# Patient Record
Sex: MALE | Race: BLACK OR AFRICAN AMERICAN | NOT HISPANIC OR LATINO | Employment: FULL TIME | URBAN - METROPOLITAN AREA
[De-identification: names, ages, dates, MRNs, and addresses within clinical notes are randomized per-mention and may not be internally consistent; named-entity substitution may affect disease eponyms.]

---

## 2021-09-20 ENCOUNTER — OFFICE VISIT (OUTPATIENT)
Dept: URGENT CARE | Facility: PHYSICIAN GROUP | Age: 46
End: 2021-09-20
Payer: COMMERCIAL

## 2021-09-20 ENCOUNTER — HOSPITAL ENCOUNTER (OUTPATIENT)
Facility: MEDICAL CENTER | Age: 46
End: 2021-09-20
Attending: NURSE PRACTITIONER
Payer: COMMERCIAL

## 2021-09-20 VITALS
OXYGEN SATURATION: 96 % | WEIGHT: 315 LBS | HEIGHT: 75 IN | TEMPERATURE: 97 F | SYSTOLIC BLOOD PRESSURE: 170 MMHG | RESPIRATION RATE: 12 BRPM | BODY MASS INDEX: 39.17 KG/M2 | HEART RATE: 106 BPM | DIASTOLIC BLOOD PRESSURE: 102 MMHG

## 2021-09-20 DIAGNOSIS — N30.00 ACUTE CYSTITIS WITHOUT HEMATURIA: ICD-10-CM

## 2021-09-20 DIAGNOSIS — N30.00 ACUTE CYSTITIS WITHOUT HEMATURIA: Primary | ICD-10-CM

## 2021-09-20 DIAGNOSIS — R03.0 ELEVATED BLOOD PRESSURE READING: ICD-10-CM

## 2021-09-20 DIAGNOSIS — R35.0 URINARY FREQUENCY: ICD-10-CM

## 2021-09-20 LAB
APPEARANCE UR: CLEAR
BILIRUB UR STRIP-MCNC: NORMAL MG/DL
COLOR UR AUTO: NORMAL
GLUCOSE UR STRIP.AUTO-MCNC: NORMAL MG/DL
KETONES UR STRIP.AUTO-MCNC: NORMAL MG/DL
LEUKOCYTE ESTERASE UR QL STRIP.AUTO: NORMAL
NITRITE UR QL STRIP.AUTO: NORMAL
PH UR STRIP.AUTO: 7 [PH] (ref 5–8)
PROT UR QL STRIP: NORMAL MG/DL
RBC UR QL AUTO: NORMAL
SP GR UR STRIP.AUTO: 1.02
UROBILINOGEN UR STRIP-MCNC: 0.2 MG/DL

## 2021-09-20 PROCEDURE — 99203 OFFICE O/P NEW LOW 30 MIN: CPT | Performed by: NURSE PRACTITIONER

## 2021-09-20 PROCEDURE — 87086 URINE CULTURE/COLONY COUNT: CPT

## 2021-09-20 PROCEDURE — 81002 URINALYSIS NONAUTO W/O SCOPE: CPT | Performed by: NURSE PRACTITIONER

## 2021-09-20 RX ORDER — CIPROFLOXACIN 500 MG/1
500 TABLET, FILM COATED ORAL EVERY 12 HOURS
Qty: 14 TABLET | Refills: 0 | Status: SHIPPED | OUTPATIENT
Start: 2021-09-20 | End: 2021-09-27

## 2021-09-20 RX ORDER — CARBAMAZEPINE 200 MG/1
CAPSULE, EXTENDED RELEASE ORAL
COMMUNITY
Start: 2021-07-24 | End: 2021-10-01 | Stop reason: SDUPTHER

## 2021-09-20 RX ORDER — PHENAZOPYRIDINE HYDROCHLORIDE 200 MG/1
200 TABLET, FILM COATED ORAL 3 TIMES DAILY PRN
Qty: 6 TABLET | Refills: 0 | Status: SHIPPED | OUTPATIENT
Start: 2021-09-20 | End: 2021-09-22

## 2021-09-20 ASSESSMENT — ENCOUNTER SYMPTOMS
CHILLS: 0
FEVER: 0
SENSORY CHANGE: 0
HEADACHES: 0
COUGH: 0
NAUSEA: 0
FLANK PAIN: 0

## 2021-09-20 ASSESSMENT — LIFESTYLE VARIABLES: SUBSTANCE_ABUSE: 0

## 2021-09-20 NOTE — PROGRESS NOTES
"Johan Wellington is a 46 y.o. male who presents for UTI (bladder soreness, discolored urine, frequency, x1 day )      HPI This is a new problem. Johan is a 47 y/o male pt with c/o dysuria and discolored urine.  He denies history of previous UTIs.  He knows is unusual for men to get UTIs.  He thinks he has not been drinking as much water as he normally does.  He currently does not have a primary care provider.  He has no history of hypertension.  He denies headache, chest pain, shortness of breath, leg swelling.  He denies penile discharge.  No other aggravating or alleviating factors.  Treatments tried increase fluids.    Review of Systems   Constitutional: Negative for chills, fever and malaise/fatigue.   Respiratory: Negative for cough.    Gastrointestinal: Negative for nausea.   Genitourinary: Positive for dysuria and urgency. Negative for flank pain and hematuria.   Neurological: Negative for sensory change and headaches.   Endo/Heme/Allergies: Negative for environmental allergies.   Psychiatric/Behavioral: Negative for substance abuse.       Allergies:     No Known Allergies    PMSFS Hx:  History reviewed. No pertinent past medical history.  History reviewed. No pertinent surgical history.  History reviewed. No pertinent family history.  Social History     Tobacco Use   • Smoking status: Never Smoker   • Smokeless tobacco: Never Used   Substance Use Topics   • Alcohol use: Not Currently       Problems:   There is no problem list on file for this patient.      Medications:   Current Outpatient Medications on File Prior to Visit   Medication Sig Dispense Refill   • carbamazepine (CARBATROL) 200 MG CR capsule        No current facility-administered medications on file prior to visit.          Objective:     BP (!) 170/102 (BP Location: Left arm, Patient Position: Sitting, BP Cuff Size: Adult)   Pulse (!) 106   Temp 36.1 °C (97 °F) (Temporal)   Resp 12   Ht 1.905 m (6' 3\")   Wt (!) 227 kg (500 lb)   " SpO2 96%   BMI 62.50 kg/m²     Physical Exam  Vitals and nursing note reviewed.   Constitutional:       General: He is not in acute distress.     Appearance: Normal appearance. He is well-developed. He is obese. He is not ill-appearing.   Cardiovascular:      Rate and Rhythm: Normal rate and regular rhythm.      Pulses: Normal pulses.      Heart sounds: Normal heart sounds.   Pulmonary:      Effort: Pulmonary effort is normal.   Abdominal:      General: Bowel sounds are normal. There is no distension.      Palpations: Abdomen is not rigid.      Tenderness: There is no abdominal tenderness. There is no right CVA tenderness or left CVA tenderness.   Skin:     General: Skin is warm and dry.      Capillary Refill: Capillary refill takes less than 2 seconds.   Neurological:      Mental Status: He is alert and oriented to person, place, and time.   Psychiatric:         Mood and Affect: Mood normal.         Behavior: Behavior normal.         Thought Content: Thought content normal.     UA: pos nitrates    Assessment /Associated Orders:      1. Acute cystitis without hematuria  AMB REFERRAL TO ESTABLISH WITH [facility]    ciprofloxacin (CIPRO) 500 MG Tab    URINE CULTURE(NEW)   2. Urinary frequency  POCT Rapid Strep A    AMB REFERRAL TO ESTABLISH WITH [facility]    phenazopyridine (PYRIDIUM) 200 MG Tab   3. Elevated blood pressure reading  AMB REFERRAL TO ESTABLISH WITH [facility]CP       Medical Decision Making:    Pt is clinically stable at today's acute urgent care visit.  No acute distress noted. Appropriate for outpatient management at this time.   Acute problem today with uncertain prognosis.     Educated in proper administration of medication(s) ordered today including safety, possible SE, risks, benefits, rationale and alternatives to therapy.     Keep well hydrated    Urine culture- pending     Referred to his primary care provider for monitoring and management. Educated in end organ effects of uncontrolled BP   including MI, CVA, Blindness, CRF and death. Educated in TLC's.  Recommend ambulatory BP monitoring.     Referral placed for PCP     Advised to follow-up with the primary care provider for recheck, reevaluation, and consideration of further management if necessary.   Discussed management options (risks,benefits, and alternatives to treatment). Expressed understanding and the treatment plan was agreed upon. Questions were encouraged and answered   Return to urgent care prn if new or worsening sx or if there is no improvement in condition prn.    Educated in Red flags and indications to immediately call 911 or present to the Emergency Department.     I personally reviewed prior external notes and test results pertinent to today's visit.  I have independently reviewed and interpreted all diagnostics ordered during this urgent care acute visit.   Time spent evaluating this patient was at least 30 minutes and includes preparing for visit, counseling/education, exam and evaluation, obtaining history, independent interpretation, ordering lab/test/procedures,medication management and documentation.Time does not include separately billable procedures noted .

## 2021-09-22 LAB
BACTERIA UR CULT: NORMAL
SIGNIFICANT IND 70042: NORMAL
SITE SITE: NORMAL
SOURCE SOURCE: NORMAL

## 2021-10-01 ENCOUNTER — OFFICE VISIT (OUTPATIENT)
Dept: MEDICAL GROUP | Facility: PHYSICIAN GROUP | Age: 46
End: 2021-10-01
Payer: COMMERCIAL

## 2021-10-01 VITALS
RESPIRATION RATE: 16 BRPM | OXYGEN SATURATION: 92 % | TEMPERATURE: 98.1 F | BODY MASS INDEX: 39.17 KG/M2 | HEART RATE: 110 BPM | WEIGHT: 315 LBS | HEIGHT: 75 IN | SYSTOLIC BLOOD PRESSURE: 160 MMHG | DIASTOLIC BLOOD PRESSURE: 102 MMHG

## 2021-10-01 DIAGNOSIS — G44.029 CHRONIC CLUSTER HEADACHE, NOT INTRACTABLE: ICD-10-CM

## 2021-10-01 DIAGNOSIS — R56.9 SEIZURE (HCC): ICD-10-CM

## 2021-10-01 DIAGNOSIS — Z13.220 ENCOUNTER FOR SCREENING FOR LIPID DISORDER: ICD-10-CM

## 2021-10-01 DIAGNOSIS — I10 PRIMARY HYPERTENSION: ICD-10-CM

## 2021-10-01 DIAGNOSIS — Z13.29 SCREENING FOR THYROID DISORDER: ICD-10-CM

## 2021-10-01 DIAGNOSIS — Z00.00 ENCOUNTER FOR HEALTH MAINTENANCE EXAMINATION IN ADULT: ICD-10-CM

## 2021-10-01 DIAGNOSIS — Z13.21 ENCOUNTER FOR VITAMIN DEFICIENCY SCREENING: ICD-10-CM

## 2021-10-01 DIAGNOSIS — D18.02 HEMANGIOMA OF INTRACRANIAL STRUCTURE (HCC): ICD-10-CM

## 2021-10-01 DIAGNOSIS — Z13.1 ENCOUNTER FOR SCREENING FOR DIABETES MELLITUS: ICD-10-CM

## 2021-10-01 DIAGNOSIS — Z79.899 HIGH RISK MEDICATION USE: ICD-10-CM

## 2021-10-01 PROCEDURE — 99214 OFFICE O/P EST MOD 30 MIN: CPT | Performed by: NURSE PRACTITIONER

## 2021-10-01 PROCEDURE — 99396 PREV VISIT EST AGE 40-64: CPT | Mod: 25 | Performed by: NURSE PRACTITIONER

## 2021-10-01 RX ORDER — CARBAMAZEPINE 200 MG/1
200 CAPSULE, EXTENDED RELEASE ORAL 2 TIMES DAILY
Qty: 180 CAPSULE | Refills: 1 | Status: SHIPPED | OUTPATIENT
Start: 2021-10-01 | End: 2021-11-19 | Stop reason: SDUPTHER

## 2021-10-01 RX ORDER — AMLODIPINE BESYLATE 5 MG/1
5 TABLET ORAL DAILY
Qty: 90 TABLET | Refills: 1 | Status: SHIPPED | OUTPATIENT
Start: 2021-10-01 | End: 2021-11-19

## 2021-10-01 RX ORDER — SUMATRIPTAN 100 MG/1
100 TABLET, FILM COATED ORAL
Qty: 10 TABLET | Refills: 3 | Status: SHIPPED | OUTPATIENT
Start: 2021-10-01 | End: 2022-01-11 | Stop reason: SDUPTHER

## 2021-10-01 ASSESSMENT — PATIENT HEALTH QUESTIONNAIRE - PHQ9: CLINICAL INTERPRETATION OF PHQ2 SCORE: 0

## 2021-10-02 NOTE — ASSESSMENT & PLAN NOTE
This is a new diagnosis. He has never been on medication before.     Will start him on amlodipine 5 mg. Will have patient return in one month.

## 2021-10-02 NOTE — ASSESSMENT & PLAN NOTE
This is a chronic condition. He reports that his mom had cluster headaches as well.     He has seen a neurologist in the past as well due to his seizure history. He states he cannot take NSAIDs due to his history of a brain bleed. He was given imitrex for his headaches. He states that this does help. However, it makes him drowsy. He states he cannot take it at work. He reports that he is getting a few headaches days month.     Will continue with imitrex as needed and place new referral to neurology.

## 2021-10-02 NOTE — PROGRESS NOTES
"  CC: establish care                                                                                                                                    HPI:   Johan presents today with the following.    Problem   Seizure (Hcc)   Hemangioma of Intracranial Structure (Hcc)   Primary Hypertension   Chronic Cluster Headache, Not Intractable       Current Outpatient Medications   Medication Sig Dispense Refill   • amLODIPine (NORVASC) 5 MG Tab Take 1 Tablet by mouth every day. 90 Tablet 1   • sumatriptan (IMITREX) 100 MG tablet Take 1 Tablet by mouth one time as needed for Migraine for up to 1 dose. 10 Tablet 3   • carbamazepine (CARBATROL) 200 MG CR capsule Take 1 Capsule by mouth 2 times a day. 180 Capsule 1     No current facility-administered medications for this visit.       Allergies as of 10/01/2021   • (No Known Allergies)        ROS:  Gen: no fevers/chills, no changes in weight  Pulm: no sob, no cough  CV: no chest pain, no palpitations  GI: no nausea/vomiting, no diarrhea  Neuro: no headaches, no numbness/tingling      /102 (BP Location: Left arm, Patient Position: Sitting, BP Cuff Size: Adult)   Pulse (!) 110   Temp 36.7 °C (98.1 °F) (Temporal)   Resp 16   Ht 1.905 m (6' 3\")   Wt (!) 252 kg (555 lb)   SpO2 92%   BMI 69.37 kg/m²     Physical Exam:  Gen:         Alert and oriented, No apparent distress.  Neck:        No Lymphadenopathy.   Lungs:     Clear to auscultation bilaterally. No wheezes, rales, or rhonchi.   CV:          Regular rate and rhythm. No murmurs, rubs or gallops.         Ext:          No clubbing, cyanosis, or peripheral edema.  Skin:  All visible skin intact without lesions.       Assessment and Plan:  46 y.o. male with the following issues.    1. Seizure (HCC)  REFERRAL TO NEUROLOGY    CANCELED: REFERRAL TO NEUROLOGY   2. Hemangioma of intracranial structure (HCC)     3. Primary hypertension     4. Chronic cluster headache, not intractable     5. Encounter for vitamin " deficiency screening  VITAMIN D,25 HYDROXY   6. Screening for thyroid disorder  TSH    FREE THYROXINE   7. Encounter for screening for diabetes mellitus  HEMOGLOBIN A1C   8. Encounter for screening for lipid disorder  Lipid Profile   9. Encounter for health maintenance examination in adult  VITAMIN D,25 HYDROXY    TSH    FREE THYROXINE    HEMOGLOBIN A1C    Lipid Profile    CBC WITHOUT DIFFERENTIAL    Comp Metabolic Panel   10. High risk medication use  CARBAMAZEPINE        Hemangioma of intracranial structure (HCC)  This was discovered when he was 27. He ended up having a brain bleed that led to a seizure. Since then he has been on carbamazepine since then.     He would like to establish with a new neurologist to discuss if he needs to continue the carbamazepine.     Chronic cluster headache, not intractable  This is a chronic condition. He reports that his mom had cluster headaches as well.     He has seen a neurologist in the past as well due to his seizure history. He states he cannot take NSAIDs due to his history of a brain bleed. He was given imitrex for his headaches. He states that this does help. However, it makes him drowsy. He states he cannot take it at work. He reports that he is getting a few headaches days month.     Will continue with imitrex as needed and place new referral to neurology.     Primary hypertension  This is a new diagnosis. He has never been on medication before.     Will start him on amlodipine 5 mg. Will have patient return in one month.      Return in about 1 month (around 11/1/2021) for follow up for labs and blood pressure.    I have placed the below orders and discussed them with an approved delegating provider.  The MA is performing the below orders under the direction of Dr. Ryan.    Please note that this dictation was created using voice recognition software. I have worked with consultants from the vendor as well as technical experts from [employer] to optimize the  interface. I have made every reasonable attempt to correct obvious errors, but I expect that there are errors of grammar and possibly content that I did not discover before finalizing the note.

## 2021-10-02 NOTE — ASSESSMENT & PLAN NOTE
This was discovered when he was 27. He ended up having a brain bleed that led to a seizure. Since then he has been on carbamazepine since then.     He would like to establish with a new neurologist to discuss if he needs to continue the carbamazepine.

## 2021-11-13 LAB — HBA1C MFR BLD: 5.9 % (ref 0–5.6)

## 2021-11-19 ENCOUNTER — OFFICE VISIT (OUTPATIENT)
Dept: MEDICAL GROUP | Facility: PHYSICIAN GROUP | Age: 46
End: 2021-11-19
Payer: COMMERCIAL

## 2021-11-19 VITALS
SYSTOLIC BLOOD PRESSURE: 144 MMHG | DIASTOLIC BLOOD PRESSURE: 88 MMHG | WEIGHT: 315 LBS | BODY MASS INDEX: 39.17 KG/M2 | HEIGHT: 75 IN | TEMPERATURE: 97.7 F | RESPIRATION RATE: 16 BRPM | OXYGEN SATURATION: 93 % | HEART RATE: 103 BPM

## 2021-11-19 DIAGNOSIS — R56.9 SEIZURE (HCC): ICD-10-CM

## 2021-11-19 DIAGNOSIS — G89.29 CHRONIC BILATERAL LOW BACK PAIN WITHOUT SCIATICA: ICD-10-CM

## 2021-11-19 DIAGNOSIS — H02.9 EYELID LESION: ICD-10-CM

## 2021-11-19 DIAGNOSIS — I10 PRIMARY HYPERTENSION: ICD-10-CM

## 2021-11-19 DIAGNOSIS — M54.50 CHRONIC BILATERAL LOW BACK PAIN WITHOUT SCIATICA: ICD-10-CM

## 2021-11-19 PROCEDURE — 99214 OFFICE O/P EST MOD 30 MIN: CPT | Performed by: NURSE PRACTITIONER

## 2021-11-19 RX ORDER — VITAMIN B COMPLEX
2000 TABLET ORAL DAILY
COMMUNITY

## 2021-11-19 RX ORDER — AMLODIPINE BESYLATE 10 MG/1
10 TABLET ORAL DAILY
Qty: 90 TABLET | Refills: 3 | Status: SHIPPED
Start: 2021-11-19 | End: 2021-11-19

## 2021-11-19 RX ORDER — CARBAMAZEPINE 200 MG/1
400 CAPSULE, EXTENDED RELEASE ORAL 2 TIMES DAILY
Qty: 360 CAPSULE | Refills: 1 | Status: SHIPPED
Start: 2021-11-19 | End: 2021-11-19

## 2021-11-19 RX ORDER — AMLODIPINE BESYLATE 10 MG/1
10 TABLET ORAL DAILY
Qty: 90 TABLET | Refills: 3 | Status: SHIPPED | OUTPATIENT
Start: 2021-11-19 | End: 2022-02-15 | Stop reason: SDUPTHER

## 2021-11-19 RX ORDER — CYCLOBENZAPRINE HCL 5 MG
5 TABLET ORAL 3 TIMES DAILY PRN
Qty: 30 TABLET | Refills: 3 | Status: SHIPPED | OUTPATIENT
Start: 2021-11-19 | End: 2022-02-15 | Stop reason: SDUPTHER

## 2021-11-19 RX ORDER — CYCLOBENZAPRINE HCL 5 MG
5 TABLET ORAL 3 TIMES DAILY PRN
Qty: 30 TABLET | Refills: 3 | Status: SHIPPED
Start: 2021-11-19 | End: 2021-11-19

## 2021-11-19 RX ORDER — CARBAMAZEPINE 200 MG/1
400 CAPSULE, EXTENDED RELEASE ORAL 2 TIMES DAILY
Qty: 360 CAPSULE | Refills: 1 | Status: SHIPPED | OUTPATIENT
Start: 2021-11-19 | End: 2022-02-15 | Stop reason: SDUPTHER

## 2021-11-20 NOTE — ASSESSMENT & PLAN NOTE
This is a chronic condition.     Patient has an appointment with neurology in Jan.     Continue carbamazepine.

## 2021-11-20 NOTE — ASSESSMENT & PLAN NOTE
This is a chronic stable condition. Patient is doing well on the amlodipine. His blood pressure has improved.     Will increase amplodipine to 10mg

## 2021-11-20 NOTE — PROGRESS NOTES
"  CC: follow up for blood pressure                                                                                                                                    HPI:   Johan presents today with the following.    Problem   Chronic Bilateral Low Back Pain Without Sciatica   Seizure (Hcc)   Primary Hypertension       Current Outpatient Medications   Medication Sig Dispense Refill   • coenzyme Q-10 30 MG capsule Take 60 mg by mouth every day.     • B Complex Vitamins (B COMPLEX 1 PO) Take 1 Tablet by mouth every day.     • Multiple Vitamins-Minerals (EMERGEN-C IMMUNE PLUS PO) Take 1 Tablet by mouth every day.     • vitamin D3 (CHOLECALCIFEROL) 1000 Unit (25 mcg) Tab Take 2,000 Units by mouth every day.     • carbamazepine (CARBATROL) 200 MG CR capsule Take 2 Capsules by mouth 2 times a day for 90 days. 360 Capsule 1   • amLODIPine (NORVASC) 10 MG Tab Take 1 Tablet by mouth every day. 90 Tablet 3   • cyclobenzaprine (FLEXERIL) 5 mg tablet Take 1 Tablet by mouth 3 times a day as needed for Moderate Pain or Muscle Spasms. 30 Tablet 3   • sumatriptan (IMITREX) 100 MG tablet Take 1 Tablet by mouth one time as needed for Migraine for up to 1 dose. 10 Tablet 3     No current facility-administered medications for this visit.       Allergies as of 11/19/2021   • (No Known Allergies)        ROS:  All systems negative expect as addressed in assessment and plan.     /88 (BP Location: Left arm, Patient Position: Sitting, BP Cuff Size: Adult)   Pulse (!) 103   Temp 36.5 °C (97.7 °F) (Temporal)   Resp 16   Ht 1.905 m (6' 3\")   Wt (!) 252 kg (555 lb)   SpO2 93%   BMI 69.37 kg/m²     Physical Exam:  Gen:         Alert and oriented, No apparent distress.  Neck:        No Lymphadenopathy.   Lungs:     Clear to auscultation bilaterally. No wheezes, rales, or rhonchi.   CV:          Regular rate and rhythm. No murmurs, rubs or gallops.         Ext:          No clubbing, cyanosis, or peripheral edema.  Skin:  All visible " skin intact without lesions.       Assessment and Plan:  46 y.o. male with the following issues.    1. Primary hypertension     2. Chronic bilateral low back pain without sciatica     3. Seizure (HCC)     4. Eyelid lesion  Referral to Dermatology        Primary hypertension  This is a chronic stable condition. Patient is doing well on the amlodipine. His blood pressure has improved.     Will increase amplodipine to 10mg      Seizure (HCC)  This is a chronic condition.     Patient has an appointment with neurology in Jan.     Continue carbamazepine.       Return in about 3 months (around 2/19/2022) for follow up for hypertension.    I have placed the below orders and discussed them with an approved delegating provider.  The MA is performing the below orders under the direction of Dr. Hernandez.    Please note that this dictation was created using voice recognition software. I have worked with consultants from the vendor as well as technical experts from [employer] [facility] [employer] to optimize the interface. I have made every reasonable attempt to correct obvious errors, but I expect that there are errors of grammar and possibly content that I did not discover before finalizing the note.

## 2021-12-22 ENCOUNTER — APPOINTMENT (RX ONLY)
Dept: URBAN - METROPOLITAN AREA CLINIC 31 | Facility: CLINIC | Age: 46
Setting detail: DERMATOLOGY
End: 2021-12-22

## 2021-12-22 PROBLEM — D23.39 OTHER BENIGN NEOPLASM OF SKIN OF OTHER PARTS OF FACE: Status: ACTIVE | Noted: 2021-12-22

## 2021-12-22 PROBLEM — D23.122 OTHER BENIGN NEOPLASM OF SKIN OF LEFT LOWER EYELID, INCLUDING CANTHUS: Status: ACTIVE | Noted: 2021-12-22

## 2021-12-22 PROCEDURE — ? BENIGN DESTRUCTION

## 2021-12-22 PROCEDURE — 17110 DESTRUCTION B9 LES UP TO 14: CPT

## 2021-12-22 PROCEDURE — ? ADDITIONAL NOTES

## 2021-12-22 PROCEDURE — ? COUNSELING

## 2021-12-22 NOTE — PROCEDURE: BENIGN DESTRUCTION
Post-Care Instructions: I reviewed with the patient in detail post-care instructions. Patient is to wear sunprotection, and avoid picking at any of the treated lesions. Pt may apply Vaseline to crusted or scabbing areas.
Medical Necessity Information: It is in your best interest to select a reason for this procedure from the list below. All of these items fulfill various CMS LCD requirements except the new and changing color options.
Render Post-Care Instructions In Note?: no
Medical Necessity Clause: This procedure was medically necessary because the lesions that were treated were:
Treatment Number (Will Not Render If 0): 0
Consent: The patient's consent was obtained including but not limited to risks of crusting, scabbing, blistering, scarring, darker or lighter pigmentary change, recurrence, incomplete removal and infection.
Detail Level: Detailed
Anesthesia Volume In Cc: 1
Anesthesia Type: 1% Xylocaine with [id number] epinephrine and sodium bicarbonate

## 2021-12-22 NOTE — PROCEDURE: ADDITIONAL NOTES
Render Risk Assessment In Note?: no
Detail Level: Detailed
Additional Notes: Discussed in detail benign diagnosis and treatments. \\nWill extract today with 11 blade. \\nDiscussed treatment and risks with patient.  \\nIncludes concern on intake.

## 2022-01-11 RX ORDER — SUMATRIPTAN 100 MG/1
100 TABLET, FILM COATED ORAL
Qty: 10 TABLET | Refills: 0 | Status: SHIPPED | OUTPATIENT
Start: 2022-01-11 | End: 2022-02-15 | Stop reason: SDUPTHER

## 2022-01-14 ENCOUNTER — OFFICE VISIT (OUTPATIENT)
Dept: NEUROLOGY | Facility: MEDICAL CENTER | Age: 47
End: 2022-01-14
Attending: PSYCHIATRY & NEUROLOGY
Payer: COMMERCIAL

## 2022-01-14 VITALS
DIASTOLIC BLOOD PRESSURE: 84 MMHG | HEART RATE: 81 BPM | OXYGEN SATURATION: 91 % | BODY MASS INDEX: 39.17 KG/M2 | HEIGHT: 75 IN | WEIGHT: 315 LBS | SYSTOLIC BLOOD PRESSURE: 132 MMHG

## 2022-01-14 DIAGNOSIS — R56.9 SEIZURE (HCC): ICD-10-CM

## 2022-01-14 DIAGNOSIS — D18.02 HEMANGIOMA OF INTRACRANIAL STRUCTURE (HCC): ICD-10-CM

## 2022-01-14 PROCEDURE — 99205 OFFICE O/P NEW HI 60 MIN: CPT | Performed by: PSYCHIATRY & NEUROLOGY

## 2022-01-14 RX ORDER — FROVATRIPTAN SUCCINATE 2.5 MG/1
2.5 TABLET, FILM COATED ORAL 2 TIMES DAILY PRN
Qty: 20 TABLET | Refills: 1 | Status: CANCELLED | OUTPATIENT
Start: 2022-01-14

## 2022-01-14 RX ORDER — ZOLMITRIPTAN 5 MG/1
5 TABLET, ORALLY DISINTEGRATING ORAL PRN
Qty: 10 TABLET | Refills: 3 | Status: CANCELLED | OUTPATIENT
Start: 2022-01-14

## 2022-01-14 RX ORDER — RIMEGEPANT SULFATE 75 MG/75MG
75 TABLET, ORALLY DISINTEGRATING ORAL
Qty: 8 TABLET | Refills: 1 | Status: CANCELLED | OUTPATIENT
Start: 2022-01-14 | End: 2022-04-14

## 2022-01-14 ASSESSMENT — PATIENT HEALTH QUESTIONNAIRE - PHQ9: CLINICAL INTERPRETATION OF PHQ2 SCORE: 0

## 2022-01-14 NOTE — PATIENT INSTRUCTIONS
Seizure precautions    Driving    Every State restricts driving in people with seizures. [place] requires that you be seizure free for 3 months from the date of your last seizure. The Department of Motor Vehicles (DMV), not the doctor, makes the decision on driving. Exceptions may be made for seizures that do not affect mental condition and ability to control a car, or that occur 100% during sleep.. We recommend:  § Do not drive if you are having seizures that would be dangerous on the road.  § Be honest with your doctor about your seizures, even if driving may be at risk  § Be honest with the DMV (use the driving form). It may protect you legally if problems later occur.    Seizure medicines and suicide    Seizure medicines have long been known to help some people with depression, but also to make others worse. The FDA recently took a look at their database of clinical studies of people taking epilepsy medicines. Their finding was 4 suicides in 27,863 patients taking epilepsy medicines, versus none in patients taking placebo (an inactive pill). They reported 105 people of the 27,863 who did not commit suicide, but had thoughts of suicide. Combined, the risk for suicidal thoughts and behavior was about 0.4% (1 in 250) for those taking epilepsy medications and 0.2% (1 in 500) for those given placebos.    This is new and important information because doctors and patients need to know the possible side effects of medicines. But it needs to be put in perspective and certainly is no reason for panic. The 4 suicides in 27,863 is a very small percentage, and it is impossible to be sure the epilepsy drugs were the cause. Depression occurs in about 10% or more of people with epilepsy, even independent of medications. We recommend the following.  § Do not stop your seizure medicine. It could be dangerous.  § If you have symptoms of depression, such as crying and low mood, please discuss them at your clinic visits, so a  decision can be made about whether antidepressant medication or referral to a psychotherapist would be useful.  § If you are thinking seriously about suicide, please call 911.  § For most people, this FDA warning is just something to know, but not a reason to change medicines.    Bone health    Several of the older antiseizure medications may cause thinning of bones with long-term use, leading to broken bones later in life. This is most problematic with phenytoin (Dilantin), but may occur with carbamazepine (Tegretol, Carbatrol), phenobarbital, primidone (Mysoline) and possibly valproate/valproic acid (Depakote, Depakene). This is a larger concern for women in mid-life or older, but it also can be an issue for men and younger women.   § This potential problem is not an emergency and occurs over months to years; do not stop your seizure medication without discussing your concerns with your doctor.   § Calcium, vitamin D3 supplementation and regular exercise may be helpful to maintain bone health.  § Periodic bone density screening may be helpful to show existence or progression of bone thinning.     Water Safety    You could drown during a seizure that occurs in water. Use the [employer] system for swimming. Let the edwige know that you have seizures. Take showers instead of baths.    Burn safety    If you have uncontrolled seizures, be very careful around heat or flames. Cook on the back burner - you are less likely to lean on the burner or turn over the soup during a seizure. Don’t smoke, which is good advice for other reasons as well. Set the maximum house hot water temperature to 110 degrees Fahrenheit. Put guards on open fireplaces, wood stoves or radiators.     Heights    Occasional use of ladders and going up and down stairs is a reasonable risk. If your seizures are not in control, then do not work on ladders or unprotected heights for more than brief minutes.    Equipment and Power Tools    Cutting, chopping and  drilling equipment should have safety guards to avoid inadvertent injury; otherwise, do not use it if your seizures are not fully controlled. Do not use mowers lacking automatic stop switches or chain saws.     Safety    If you have uncontrolled seizures, do not carry your child in your arms, but use one of the slings/papooses. Change the baby on the floor. Do not bathe the baby in water deep enough for the mouth to be underwater. Breastfeeding is usually considered beneficial, even though small amounts of seizure medicines come out in the breast milk.    Fall Precautions      If you fall with some of your seizures, then fall-proof your environment. Put in carpets, cover sharp corners, and consider wearing a protective helmet in some circumstances.    Sudden Unexplained Death in Epilepsy (SUDEP)    It is rare for people to die from a seizure, but it can happen. One way is trauma or a car crash from a seizure. Another is the poorly understood condition called sudden unexplained death in epilepsy (SUDEP). We think this is most likely due to heart arrhythmias (irregular beats) caused by a seizure, but the mechanism is debated. For people with uncontrolled seizures we recommend:  § Do not suddenly stop your seizure medication, since this can be a risk factor for SUDEP.  § Do not be overly worried about SUDEP. It is tragic when it happens, but it is uncommon and there are currently no preventive measures, other than the best possible seizure control.    Medication Side Effects    To be approved for prescription use, seizure medicines must pass strict safety testing. Nevertheless, they all have side effects, some of which are potentially serious or even lethal. The risks of medications must be balanced against the risks of seizures. A full discussion of possible medicine side effects is not possible here, but we recommend:  § Know the main side effects of your seizure medicines. Your doctor is the best source  for individual information. Web sites such as epilepsyfoundation.org and epilepsy.com have good information.  § The package insert provided with your prescription lists full information on side effects, but most of these will never occur in an individual. Let the package insert inform you, but not scare you. Be aware that some side effects occur from drug interactions among all your medications. Interactions can involve prescription medicines, over-the-counter medicines, herbal remedies and even some foods. Grapefruit juice is an example of a seemingly benign food that can raise levels of carbamazepine or other drugs.  § Generic medications are less expensive, but may not produce the same blood levels as do brand name drugs or even other generics. Insurance plans and pharmacies sometimes switch to generics without patient or doctor approval. Be cautious if you are switching or being switched to generics. It may work out fine (and it often is a lot less expensive), but a blood test to check levels might be useful.    Recreation    If having a seizure during a recreational activity would cause you significant harm, then do not do the activity. Use common sense. Confer with your medical team for individual restrictions. As a general guideline for starting discussion with your medical team, we recommend:  § Low-risk recreation can be done by people with seizures, even if not in control. These include walking, running, bowling, golf, baseball, basketball, soccer, volleyball, swimming with the [employer] system, weight training with machines or spotters, elliptical trainers, treadmills with spotters. Confirm this with your medical care team.  § You should be able to go at least 3 months without a seizure to participate in medium-risk activities, but confirm this interval with your doctor. These include football, hockey, ice skating, bike racing, gymnastics, horseback riding and boating.  § You should be seizure free for more  than a year to perform high-risk activities, although some doctors recommend not engaging in high-risk recreational activities at all with a history of epilepsy. Ask yours if it is safe to engage in high-risk recreation. High-risk activities include hang gliding, motor sports, skiing, competitive skateboarding, mountain or rock climbing and SCUBA diving.         Bactrim Counseling:  I discussed with the patient the risks of sulfa antibiotics including but not limited to GI upset, allergic reaction, drug rash, diarrhea, dizziness, photosensitivity, and yeast infections.  Rarely, more serious reactions can occur including but not limited to aplastic anemia, agranulocytosis, methemoglobinemia, blood dyscrasias, liver or kidney failure, lung infiltrates or desquamative/blistering drug rashes.

## 2022-01-14 NOTE — PROGRESS NOTES
New Patient    Patient: Johan Wellington  : 1975    Referring Physician:Alpa Morgan A.P* Jennifer L Simcox, A.P.R.N.    CC: seizures      IMPRESSION:    1. Seizure type and frequency of seizures-focal onset seizure   Only seizure   2. Etiology/Syndrome- focal structural lesion   3. EEG findings- n/a  4. 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 Antiepileptic drug side effects and counseling done  Current AED: /400  6. Surgery consideration vns done   7. Safety issues counseling done     Left frontoparietal hemisphere hemangioma: stable on CTA   Cluster headache: respond to Imitrex     PLAN:    1. Seizure (HCC)  - Referral to Neurodiagnostics (EEG,EP,EMG/NCS/DBS)    2. Hemangioma of intracranial structure (HCC)  - Referral to Neurology     Routine EEG for baseline  Given his risk for seizure, recommend to continue CBZ for now. We will discuss further till EEG is completed.   3. Refer to headache/stroke bridge clinic  4. Return in 6 months.         HISTORY:    I had the opportunity to see Mr. Johan Wellington in the epilepsy clinic on 2022 for seizure disorder. he came alone.  This is his initial visit. he is referred by MARTINA Guardado for possible seizure disorder. He was previously seen by Dr. West.      Dominant hand: right handed   Outside records and ED record reviewed: Yes.    In brief, his pertinent medical history is remarkable for left hemisphere hemangioma s/p hemorrhage with resultant seizure at age 27 ()  One seizure only, nocturnal seizure and he was found to have to ICH due to hemangioma   He was started on PHT with gum issue and switched to CBZ.   He has been on CBZ since then without recurrent seizure.     He has chronic cluster headache, his mother also has cluster headache.   He was given Imitrex with good resolution for headache, he has 2 per month.     The seizures were isolated. There was    no cluster  of seizures,    no history of status epilepticus    Special features:  They were noted only during the sleep,  there was no specific timing.     Potential triggering factors were reviewed   Sleep deprivation   Alcohol   Stress   Other    Epilepsy risk factors:     -Positive:  left frontoparietal hemisphere hemangioma s/p bleeding     Current antiepileptic medicines:   mg, 2/2      Previous workup:    1. EEG data: n/a    2. Neuroimaging data: CT head 2007 STABLE LEFT TEMPOROPARIETAL LESION CONSISTENT WITH A HISTORY OF VASCULAR   MALFORMATION.  THERE HAS BEEN NO SIGNIFICANT INTERVAL CHANGE SINCE 2005.       CTA 2008   1.3 CM SPHERICAL LESION CONTAINING SOME CALCIFICATIONS IN THE LEFT   CEREBRAL HEMISPHERE IS IDENTIFIED AS SEEN ON THE PRIOR OUTSIDE CTs.  NO   LARGE VASCULAR STRUCTURE IS IDENTIFIED WITHIN OR LEADING TO OR AWAY FROM   THIS STRUCTURE THAT WOULD SUGGEST AVM.  DIFFERENTIAL DIAGNOSIS INCLUDES   CAPILLARY TELANGIECTASIA OR CAVERNOUS HEMANGIOMA.  NO SURROUNDING BRAIN   SWELLING OR EDEMA IS IDENTIFIED.        3. Recent AED level: CBZ level 5.8  Vit D 37  CBC, CMP normal 11/2021     Prior antiepileptic medications were reviewed  Carbamazepine (Tegretol)  and Phenytoin (Dilantin)    Antiepileptic medications most useful:    Other therapeutic interventions:   Vagus nerve stimulation   Diet   Surgery for epilepsy   Other    Current Outpatient Medications   Medication Sig Dispense Refill   • sumatriptan (IMITREX) 100 MG tablet Take 1 Tablet by mouth one time as needed for Migraine for up to 1 dose. 10 Tablet 0   • coenzyme Q-10 30 MG capsule Take 60 mg by mouth every day.     • B Complex Vitamins (B COMPLEX 1 PO) Take 1 Tablet by mouth every day.     • Multiple Vitamins-Minerals (EMERGEN-C IMMUNE PLUS PO) Take 1 Tablet by mouth every day.     • vitamin D3 (CHOLECALCIFEROL) 1000 Unit (25 mcg) Tab Take 2,000 Units by mouth every day.     • carbamazepine (CARBATROL) 200 MG CR capsule Take 2 Capsules by mouth 2  times a day for 90 days. 360 Capsule 1   • amLODIPine (NORVASC) 10 MG Tab Take 1 Tablet by mouth every day. 90 Tablet 3   • cyclobenzaprine (FLEXERIL) 5 mg tablet Take 1 Tablet by mouth 3 times a day as needed for Moderate Pain or Muscle Spasms. 30 Tablet 3     No current facility-administered medications for this visit.        No Known Allergies    History reviewed. No pertinent past medical history.    Social History     Socioeconomic History   • Marital status:      Spouse name: Not on file   • Number of children: Not on file   • Years of education: Not on file   • Highest education level: Not on file   Occupational History   • Not on file   Tobacco Use   • Smoking status: Never Smoker   • Smokeless tobacco: Never Used   Substance and Sexual Activity   • Alcohol use: Not Currently   • Drug use: Not on file   • Sexual activity: Not on file   Other Topics Concern   • Not on file   Social History Narrative   • Not on file     Social Determinants of Health     Financial Resource Strain:    • Difficulty of Paying Living Expenses: Not on file   Food Insecurity:    • Worried About Running Out of Food in the Last Year: Not on file   • Ran Out of Food in the Last Year: Not on file   Transportation Needs:    • Lack of Transportation (Medical): Not on file   • Lack of Transportation (Non-Medical): Not on file   Physical Activity:    • Days of Exercise per Week: Not on file   • Minutes of Exercise per Session: Not on file   Stress:    • Feeling of Stress : Not on file   Social Connections:    • Frequency of Communication with Friends and Family: Not on file   • Frequency of Social Gatherings with Friends and Family: Not on file   • Attends Protestant Services: Not on file   • Active Member of Clubs or Organizations: Not on file   • Attends Club or Organization Meetings: Not on file   • Marital Status: Not on file   Intimate Partner Violence:    • Fear of Current or Ex-Partner: Not on file   • Emotionally Abused: Not  "on file   • Physically Abused: Not on file   • Sexually Abused: Not on file   Housing Stability:    • Unable to Pay for Housing in the Last Year: Not on file   • Number of Places Lived in the Last Year: Not on file   • Unstable Housing in the Last Year: Not on file       Family History   Problem Relation Age of Onset   • Heart Disease Mother    • Arthritis Mother    • Hypertension Mother    • Diabetes Mother    • Cancer Father    • No Known Problems Sister          PHYSICAL EXAM:    /84 (BP Location: Left arm, Patient Position: Sitting, BP Cuff Size: Adult)   Pulse 81   Ht 1.905 m (6' 3\")   Wt (!) 227 kg (500 lb)   SpO2 91%   BMI 62.50 kg/m²     On examination,  He appears his stated age. He has a normal, reactive affect. No apparent distress,  alert and appropriate. No labored breathing.  There is no peripheral edema. Skin: no rash or abnormalities    He gives a precise account of  his clinical symptoms. He has fluent conversational speech, without error.    Visual fields are full to confrontation.  Pupils are equal, round, and reactive to light.  Extraocular movements are conjugate, full, and without nystagmus His face is symmetric.     There is normal muscle bulk and tone in all four extremities.  No drift, or satelliting of the upper extremities.  Normal amplitude rapid alternating movements using the digits of either hand.  I see no tremor.     Gait is normal.         The total visit duration was of 71 minutes of which more than 50% was spent in coordination of care and counseling.         Abdiaziz Hdez MD  Diplomate, American Board of Psychiatry and Neurology   Diplomate, American Board of Psychiatry and Neurology in Epilepsy        "

## 2022-02-15 ENCOUNTER — OFFICE VISIT (OUTPATIENT)
Dept: MEDICAL GROUP | Facility: PHYSICIAN GROUP | Age: 47
End: 2022-02-15
Payer: COMMERCIAL

## 2022-02-15 VITALS
DIASTOLIC BLOOD PRESSURE: 78 MMHG | RESPIRATION RATE: 14 BRPM | HEIGHT: 75 IN | TEMPERATURE: 97.8 F | WEIGHT: 315 LBS | OXYGEN SATURATION: 91 % | HEART RATE: 105 BPM | SYSTOLIC BLOOD PRESSURE: 136 MMHG | BODY MASS INDEX: 39.17 KG/M2

## 2022-02-15 DIAGNOSIS — Z23 NEED FOR VACCINATION: ICD-10-CM

## 2022-02-15 DIAGNOSIS — R56.9 SEIZURE (HCC): ICD-10-CM

## 2022-02-15 DIAGNOSIS — G44.029 CHRONIC CLUSTER HEADACHE, NOT INTRACTABLE: ICD-10-CM

## 2022-02-15 PROCEDURE — 90715 TDAP VACCINE 7 YRS/> IM: CPT | Performed by: NURSE PRACTITIONER

## 2022-02-15 PROCEDURE — 90471 IMMUNIZATION ADMIN: CPT | Performed by: NURSE PRACTITIONER

## 2022-02-15 PROCEDURE — 99214 OFFICE O/P EST MOD 30 MIN: CPT | Mod: 25 | Performed by: NURSE PRACTITIONER

## 2022-02-15 RX ORDER — SUMATRIPTAN 100 MG/1
100 TABLET, FILM COATED ORAL
Qty: 10 TABLET | Refills: 5 | Status: SHIPPED | OUTPATIENT
Start: 2022-02-15 | End: 2022-09-19

## 2022-02-15 RX ORDER — AMLODIPINE BESYLATE 10 MG/1
10 TABLET ORAL DAILY
Qty: 90 TABLET | Refills: 3 | Status: SHIPPED | OUTPATIENT
Start: 2022-02-15 | End: 2023-01-20

## 2022-02-15 RX ORDER — CARBAMAZEPINE 200 MG/1
400 CAPSULE, EXTENDED RELEASE ORAL 2 TIMES DAILY
Qty: 360 CAPSULE | Refills: 1 | Status: SHIPPED | OUTPATIENT
Start: 2022-02-15 | End: 2022-07-25

## 2022-02-15 RX ORDER — CYCLOBENZAPRINE HCL 5 MG
5-10 TABLET ORAL 3 TIMES DAILY PRN
Qty: 90 TABLET | Refills: 3 | Status: SHIPPED | OUTPATIENT
Start: 2022-02-15 | End: 2023-06-21 | Stop reason: SDUPTHER

## 2022-02-16 NOTE — ASSESSMENT & PLAN NOTE
This is a chronic condition. Patient has been on Carbetrol for several years. He recently established with a new neurologist. He is scheduled for an EEG.     Continue following with neurology.

## 2022-02-16 NOTE — ASSESSMENT & PLAN NOTE
This is a chronic condition. Patient reports a one severe cluster headache in the last few months. He states he was down for about 2 days. He did take his imitrex which helped.     Refill imitrex.

## 2022-02-16 NOTE — PROGRESS NOTES
"  CC: Follow up for HTN and seizure                                                                                                                                   HPI:   Johan presents today with the following.    Problem   Seizure (Hcc)   Chronic Cluster Headache, Not Intractable       Current Outpatient Medications   Medication Sig Dispense Refill   • carbamazepine (CARBATROL) 200 MG CR capsule Take 2 Capsules by mouth 2 times a day for 90 days. 360 Capsule 1   • sumatriptan (IMITREX) 100 MG tablet Take 1 Tablet by mouth one time as needed for Migraine for up to 1 dose. 10 Tablet 5   • amLODIPine (NORVASC) 10 MG Tab Take 1 Tablet by mouth every day. 90 Tablet 3   • cyclobenzaprine (FLEXERIL) 5 mg tablet Take 1-2 Tablets by mouth 3 times a day as needed for Moderate Pain or Muscle Spasms. 90 Tablet 3   • coenzyme Q-10 30 MG capsule Take 60 mg by mouth every day.     • B Complex Vitamins (B COMPLEX 1 PO) Take 1 Tablet by mouth every day.     • Multiple Vitamins-Minerals (EMERGEN-C IMMUNE PLUS PO) Take 1 Tablet by mouth every day.     • vitamin D3 (CHOLECALCIFEROL) 1000 Unit (25 mcg) Tab Take 2,000 Units by mouth every day.       No current facility-administered medications for this visit.       Allergies as of 02/15/2022   • (No Known Allergies)        ROS:  All systems negative expect as addressed in assessment and plan.     /78 (BP Location: Right arm, Patient Position: Sitting, BP Cuff Size: Large adult)   Pulse (!) 105   Temp 36.6 °C (97.8 °F) (Temporal)   Resp 14   Ht 1.905 m (6' 3\")   Wt (!) 250 kg (552 lb)   SpO2 91%   BMI 69.00 kg/m²     Physical Exam:  Gen:         Alert and oriented, No apparent distress.  Neck:        No Lymphadenopathy.   Lungs:     Clear to auscultation bilaterally. No wheezes, rales, or rhonchi.   CV:          Regular rate and rhythm. No murmurs, rubs or gallops.         Ext:          No clubbing, cyanosis, or peripheral edema.  Skin:  All visible skin intact without " lesions.       Assessment and Plan:  46 y.o. male with the following issues.    1. Seizure (HCC)     2. Chronic cluster headache, not intractable     3. Need for vaccination  Tdap =>8yo IM      Seizure (HCC)  This is a chronic condition. Patient has been on Carbetrol for several years. He recently established with a new neurologist. He is scheduled for an EEG.     Continue following with neurology.     Chronic cluster headache, not intractable  This is a chronic condition. Patient reports a one severe cluster headache in the last few months. He states he was down for about 2 days. He did take his imitrex which helped.     Refill imitrex.      Return in about 6 months (around 8/15/2022) for follow up for cluster headaches and htn.    I have placed the below orders and discussed them with an approved delegating provider.  The MA is performing the below orders under the direction of Dr. mcclure.    Please note that this dictation was created using voice recognition software. I have worked with consultants from the vendor as well as technical experts from [facility] to optimize the interface. I have made every reasonable attempt to correct obvious errors, but I expect that there are errors of grammar and possibly content that I did not discover before finalizing the note.

## 2022-07-13 NOTE — PROGRESS NOTES
No chief complaint on file.      Problem List Items Addressed This Visit    None         History of present illness:  Johan Wellington 47 y.o. male presents today to establish care with me. Last seen by Dr. Hdez in Jan 2022    In brief, his pertinent medical history is remarkable for left hemisphere hemangioma s/p hemorrhage with resultant seizure at age 27 (2002)  One seizure only, nocturnal seizure and he was found to have to ICH due to hemangioma   He was started on PHT with gum issue and switched to CBZ.   He has been on CBZ since then without recurrent seizure.      He has chronic cluster headache, his mother also has cluster headache.   He was given Imitrex with good resolution for headache, he has 2 per month.      The seizures were isolated. There was               no cluster of seizures,               no history of status epilepticus     Special features:  They were noted only during the sleep,  there was no specific timing.      Potential triggering factors were reviewed              Sleep deprivation              Alcohol              Stress              Other     Epilepsy risk factors:      -Positive:  left frontoparietal hemisphere hemangioma s/p bleeding      Current antiepileptic medicines:   mg, 2/2    1. Seizure type and frequency of seizures-focal onset seizure   Only seizure 2002  2. Etiology/Syndrome- focal structural lesion   3. EEG findings- n/a  4. 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 Antiepileptic drug side effects and counseling done  Current AED: /400  6. Surgery consideration vns done   7. Safety issues counseling done      Left frontoparietal hemisphere hemangioma: stable on CTA   Cluster headache: respond to Imitrex        PLAN:     1. Seizure (HCC)  - Referral to Neurodiagnostics (EEG,EP,EMG/NCS/DBS)     2. Hemangioma of intracranial structure (HCC)  - Referral to Neurology     Routine EEG for baseline  Given his risk for seizure,  recommend to continue CBZ for now. We will discuss further till EEG is completed.   3. Refer to headache/stroke bridge clinic  4. Return in 6 months.    Past medical history:   No past medical history on file.    Past surgical history:   No past surgical history on file.    Family history:   Family History   Problem Relation Age of Onset   • Heart Disease Mother    • Arthritis Mother    • Hypertension Mother    • Diabetes Mother    • Cancer Father    • No Known Problems Sister        Social history:   Social History     Socioeconomic History   • Marital status:      Spouse name: Not on file   • Number of children: Not on file   • Years of education: Not on file   • Highest education level: Not on file   Occupational History   • Not on file   Tobacco Use   • Smoking status: Never Smoker   • Smokeless tobacco: Never Used   Substance and Sexual Activity   • Alcohol use: Not Currently   • Drug use: Not on file   • Sexual activity: Not on file   Other Topics Concern   • Not on file   Social History Narrative   • Not on file     Social Determinants of Health     Financial Resource Strain: Not on file   Food Insecurity: Not on file   Transportation Needs: Not on file   Physical Activity: Not on file   Stress: Not on file   Social Connections: Not on file   Intimate Partner Violence: Not on file   Housing Stability: Not on file       Current medications:   Current Outpatient Medications   Medication   • sumatriptan (IMITREX) 100 MG tablet   • amLODIPine (NORVASC) 10 MG Tab   • cyclobenzaprine (FLEXERIL) 5 mg tablet   • coenzyme Q-10 30 MG capsule   • B Complex Vitamins (B COMPLEX 1 PO)   • Multiple Vitamins-Minerals (EMERGEN-C IMMUNE PLUS PO)   • vitamin D3 (CHOLECALCIFEROL) 1000 Unit (25 mcg) Tab     No current facility-administered medications for this visit.       Medication Allergy:  No Known Allergies      Review of systems:     General: Denies fevers or chills, or nightsweats, or generalized fatigue.    Head:  Denies headaches or dizziness or lightheadedness  EENT: Denies vision changes, vision loss or pain, nasal secretion, nasal bleeding, difficulty swallowing, hearing loss, tinnitus, vertigo, ear pain  Endocdrinologic: Denies sweating, cold or heat intolerance. No polyuria or polydipsia.   Respiratory: Denies shortness of breath, cough, sputum, or wheezing  Cardiac: Denies chest pain, palpitations, edema or syncope  Gastrointestinal: Denies nausea, vomiting, no abdominal pain or change in bowel habits, no melena or hematochezia  Urinary: Denies dysuria, frequency, hesitancy, or incontinence.  Dermatologic:  Denies new rash  Musculoskeletal: Denies muscle pain or swelling, no atrophy, no neck and back pain or stiffness.   Neurologic: Denies facial droopiness, muscle weakness (focal or generalized), paresthesias, ataxia, change in speech or language, memory loss, abnormal movements, seizures, loss of consciousness, or episodes of confusion.   Psychiatric: Denies anxiety, depression, mood swings, suicidal or homicidal thoughts       Physical examination:   There were no vitals filed for this visit.  General: Patient in no acute distress, pleasant and cooperative.  HEENT: Normocephalic, no signs of acute trauma.   moist conjunctivae. Nares are patent. Oropharynx clear without lesions and normal  hard and soft palates.   Neck: Supple. There is normal range of motion.   Resp: clear to auscultation bilaterally. No wheezes or crackles.   CV: RRR, no murmurs.   Abdomen: normoactive bowel sounds, soft, non distended or tender.   Skin: no signs of acute rashes or trauma.   Musculoskeletal: joints exhibit full range of motion, without any pain to palpation. There are no signs of joint or muscle swelling. There is no tenderness to deep palpation of muscles.   Psychiatric: No hallucinatory behavior. No symptoms of depression or suicidal ideation. Mood and affect appear normal on exam.     NEUROLOGICAL EXAM:   Mental status,  orientation: Awake, alert and fully oriented.   Speech and language: speech is clear and fluent. The patient is able to name, repeat and comprehend.   Memory: There is intact recollection of recent and remote events.   Cranial nerve exam:   CN I: Not examined   CN II: PERRL. Fundoscopic exam was unremarkable.  CN III, IV, VI: EOMI; no nystagmus   CN V: Facial sensation intact bilaterally   CN VII: face symmetric   CN VIII: hearing intact to finger rub bilaterally   CN IX, X: palate elevates symmetrically   CN XI: Symmetric shoulder shrug  CN XII: tongue midline. No signs of tongue biting or fasciculations   Motor exam: Strength is 5/5 in all extremities. Tone is normal. No abnormal movements were seen on exam.   Sensory exam reveals normal sense of light touch, proprioception, vibration and pinprick in all extremities.   Deep tendon reflexes:  2+ throughout. Plantar responses are flexor. There is no clonus.   Coordination: shows a normal finger-nose-finger. Normal rapidly alternating movements.   Gait: The patient was able to get up from seated position on first attempt without requiring assistance. Found to be steady when walking. Movements were fluid with normal arm swing. The patient was able to turn without difficulties or tendency to fall. Romberg exam ***      ANCILLARY DATA REVIEWED:       Lab Data Review:  Reviewed in chart. No results found for this or any previous visit (from the past 24 hour(s)).      Records reviewed:   Reviewed in chart.    Imaging:   MRI brain 2007  STABLE LEFT TEMPOROPARIETAL LESION CONSISTENT WITH A HISTORY OF VASCULAR   MALFORMATION.  THERE HAS BEEN NO SIGNIFICANT INTERVAL CHANGE SINCE   11/17/05.        EEG:  n/a        ASSESSMENT AND PLAN:    There are no diagnoses linked to this encounter.        CLINICAL DISCUSSION:    Mood is ***. No suicidal or homicidal thoughts. Pt referred to psychiatrist or psychologist as requested.     Past ASM's:    Current ASM's:      Plan:  -  -  Discussed avoidance of spell/sz triggers: alcohol, sleep deprivation, and stress.    - Discussed Vit D supplementation. Recommended taking 2000-5000u daily.    - Discussed driving restrictions. Pt/family aware of no driving for at least 3 months after a spell per [place] law. Will need to be released to drive by a medical provider or a neurologist. Pt is cleared to drive on     - Discussed driving restrictions. Okay to drive but aware to stop driving immediately if a spell occurs and report to us.      -Labs to be checked for next appointment:               FOLLOW-UP:   No follow-ups on file.      EDUCATION AND COUNSELING:  -Education was provided to the patient and/or family regarding diagnosis and prognosis. The chronic and unpredictable nature of the condition were discussed. There is increased risk for additional events, which may carry potential for significant injuries and death. Discussed frequent seizure triggers: sleep deprivation, medication non-compliance, use of illegal drugs/alcohol, stress, and others.   - There are potential side effects of antiepileptics including but no limited to: hypersensitivity reactions (rash and others, some of which can be fatal), visual field changes (some of which may be irreversible), glaucoma, diplopia, kidney stones, osteopenia/osteoporosis/bone fractures, hyperthermia/anhydrosis, hyponatremia, tremors/abnormal movements, ataxia, dizziness, fatigue, increased risk for falls, risk for cardiac arrhythmias/syncope, gastrointestinal side effects(hepatitis, pancreatitis, gastritis, ulcers), gingival hypertrophy/bleeding, drowsiness, sedation, anxiety/nervousness, increased risk for suicide, increased risk for depression, and psychosis.   -Recommend chronic vitamin D supplementation and regular exercise (if not contraindicated).   -Patient/family educated on risk for SUDEP (Sudden Death in Epilepsy). Counseling was provided on the importance of strict medication and follow up  compliance. The patient/family understand the risks associated with non-adherence with the medical plan as outlined, including but not limited to an increased risk for breakthrough seizures, which may contribute to injuries, disability, status epilepticus, and even death.   -There are potential effects of alcohol and other drugs, which may lower seizure threshold and/or affect the metabolism of antiepileptic drugs. We recommend avoidance of alcohol and illegal drugs.  -Avoid sleep deprivation.   -The patient is encourage to report to the Division of Motor Vehicles of any condition and/or spells related to confusion, disorientation, and/or loss of awareness and/or loss of consciousness; as these may pose a safety issue if they occur while operating a motor vehicle. The patient and/or family are ultimately responsible for exercising caution and abiding to regulations in place.   -Other seizure precautions were discussed at length, including no diving, no skydiving, no climbing or exposure to unprotected heights, no unsupervised swimming, no Jacuzzi or bathing in bathtubs or deep bodies of water. There are risks for operating any machinery while suffering from seizures / syncope / epilepsy and/or while taking antiepileptic drugs.   -The patient understands and agrees that due to the complexity of his/her diagnosis, results of any testing and further recommendations will typically be discussed/made during a face to face encounter in my office. The patient and/or family further understands it is their responsibility to keep proper follow up.     Patient/family agree with plan, as outlined.         Taisha Valles, MSN, APRN, FNP-C  [facility]  Office: [phone]  Fax: [phone]

## 2022-07-19 ENCOUNTER — OFFICE VISIT (OUTPATIENT)
Dept: MEDICAL GROUP | Facility: PHYSICIAN GROUP | Age: 47
End: 2022-07-19
Payer: COMMERCIAL

## 2022-07-19 VITALS
HEART RATE: 92 BPM | RESPIRATION RATE: 14 BRPM | TEMPERATURE: 97.7 F | DIASTOLIC BLOOD PRESSURE: 76 MMHG | WEIGHT: 315 LBS | SYSTOLIC BLOOD PRESSURE: 144 MMHG | BODY MASS INDEX: 39.17 KG/M2 | OXYGEN SATURATION: 96 % | HEIGHT: 75 IN

## 2022-07-19 DIAGNOSIS — Z02.89 ENCOUNTER FOR COMPLETION OF FORM WITH PATIENT: ICD-10-CM

## 2022-07-19 PROCEDURE — 99212 OFFICE O/P EST SF 10 MIN: CPT | Performed by: INTERNAL MEDICINE

## 2022-07-20 NOTE — PROGRESS NOTES
"Established Patient    Chief Complaint   Patient presents with   • Paperwork       Subjective:     HPI:   Johan presents today with the following.    Patient Active Problem List    Diagnosis Date Noted   • Chronic bilateral low back pain without sciatica 11/19/2021   • Seizure (HCC) 10/01/2021   • Hemangioma of intracranial structure (HCC) 10/01/2021   • Primary hypertension 10/01/2021   • Chronic cluster headache, not intractable 10/01/2021       Current Outpatient Medications on File Prior to Visit   Medication Sig Dispense Refill   • sumatriptan (IMITREX) 100 MG tablet Take 1 Tablet by mouth one time as needed for Migraine for up to 1 dose. 10 Tablet 5   • amLODIPine (NORVASC) 10 MG Tab Take 1 Tablet by mouth every day. 90 Tablet 3   • cyclobenzaprine (FLEXERIL) 5 mg tablet Take 1-2 Tablets by mouth 3 times a day as needed for Moderate Pain or Muscle Spasms. 90 Tablet 3   • coenzyme Q-10 30 MG capsule Take 60 mg by mouth every day.     • B Complex Vitamins (B COMPLEX 1 PO) Take 1 Tablet by mouth every day.     • Multiple Vitamins-Minerals (EMERGEN-C IMMUNE PLUS PO) Take 1 Tablet by mouth every day.     • vitamin D3 (CHOLECALCIFEROL) 1000 Unit (25 mcg) Tab Take 2,000 Units by mouth every day.       No current facility-administered medications on file prior to visit.       Allergies, past medical history, past surgical history, family history, social history reviewed and updated    ROS:  All other systems reviewed and are negative except as stated in the HPI       Physical Exam:     BP (!) 144/76 (BP Location: Left arm, Patient Position: Sitting, BP Cuff Size: Large adult)   Pulse 92   Temp 36.5 °C (97.7 °F) (Temporal)   Resp 14   Ht 1.905 m (6' 3\")   Wt (!) 250 kg (552 lb)   SpO2 96%   BMI 69.00 kg/m²   General: Normal appearing. No distress.  Pulmonary: Clear to ausculation.  Normal effort.   Cardiovascular: Regular rate and rhythm    Assessment and Plan:     47 y.o. male with the following issues.    1. " Encounter for completion of form with patient  Patient is mainly here to fill out FMLA paperwork for his chronic condition, I spent like 15 minutes to fill out the form, patient is currently taking amlodipine 10 mg for blood pressure, advised to check blood pressure at home and bring it to the provider for optimization of blood pressure numbers    -discussion  on target blood pressure goal, advised monitoring BP closely at home.  Have BP log to present at follow-up visit or send through my chart.   -Advised low-salt diet, healthy dietary option include plenty of vegetable, reduce refine carbohydrates and sugar, regular exercise as tolerated, healthy fat/protein/carbs, also avoid alcohol, no NSAIDs  If symptoms worsen or persist patient can return to clinic for reevaluation.  Red flags and strict emergency room precautions discussed.  Discussed side effects of medication. Patient understand    Please note that this dictation was created using voice recognition software. I have made every reasonable attempt to correct obvious errors, but I expect that there are errors of grammar and possibly content that I did not discover before finalizing the note.    Signed by: Jody Vera M.D.

## 2022-07-21 ENCOUNTER — APPOINTMENT (OUTPATIENT)
Dept: NEUROLOGY | Facility: MEDICAL CENTER | Age: 47
End: 2022-07-21
Attending: NURSE PRACTITIONER
Payer: COMMERCIAL

## 2022-07-26 RX ORDER — CARBAMAZEPINE 200 MG/1
CAPSULE, EXTENDED RELEASE ORAL
Qty: 240 CAPSULE | Refills: 0 | Status: SHIPPED | OUTPATIENT
Start: 2022-07-26 | End: 2022-09-19 | Stop reason: SDUPTHER

## 2022-09-19 ENCOUNTER — OFFICE VISIT (OUTPATIENT)
Dept: MEDICAL GROUP | Facility: PHYSICIAN GROUP | Age: 47
End: 2022-09-19
Payer: COMMERCIAL

## 2022-09-19 VITALS
HEART RATE: 83 BPM | BODY MASS INDEX: 39.17 KG/M2 | WEIGHT: 315 LBS | TEMPERATURE: 98.1 F | OXYGEN SATURATION: 97 % | DIASTOLIC BLOOD PRESSURE: 78 MMHG | SYSTOLIC BLOOD PRESSURE: 130 MMHG | HEIGHT: 75 IN

## 2022-09-19 DIAGNOSIS — M79.89 SWELLING OF LOWER LEG: ICD-10-CM

## 2022-09-19 DIAGNOSIS — G44.029 CHRONIC CLUSTER HEADACHE, NOT INTRACTABLE: ICD-10-CM

## 2022-09-19 DIAGNOSIS — R56.9 SEIZURE (HCC): ICD-10-CM

## 2022-09-19 DIAGNOSIS — Z23 NEED FOR VACCINATION: ICD-10-CM

## 2022-09-19 PROCEDURE — 90471 IMMUNIZATION ADMIN: CPT | Performed by: NURSE PRACTITIONER

## 2022-09-19 PROCEDURE — 99214 OFFICE O/P EST MOD 30 MIN: CPT | Mod: 25 | Performed by: NURSE PRACTITIONER

## 2022-09-19 PROCEDURE — 90472 IMMUNIZATION ADMIN EACH ADD: CPT | Performed by: NURSE PRACTITIONER

## 2022-09-19 PROCEDURE — 90746 HEPB VACCINE 3 DOSE ADULT IM: CPT | Performed by: NURSE PRACTITIONER

## 2022-09-19 PROCEDURE — 90686 IIV4 VACC NO PRSV 0.5 ML IM: CPT | Performed by: NURSE PRACTITIONER

## 2022-09-19 RX ORDER — HYDROXYZINE HYDROCHLORIDE 25 MG/1
25 TABLET, FILM COATED ORAL 3 TIMES DAILY PRN
Qty: 90 TABLET | Refills: 1 | Status: SHIPPED | OUTPATIENT
Start: 2022-09-19

## 2022-09-19 RX ORDER — PROPRANOLOL HYDROCHLORIDE 10 MG/1
10 TABLET ORAL 3 TIMES DAILY PRN
Qty: 90 TABLET | Refills: 1 | Status: SHIPPED | OUTPATIENT
Start: 2022-09-19 | End: 2023-10-23

## 2022-09-19 RX ORDER — BUTALBITAL, ACETAMINOPHEN AND CAFFEINE 50; 325; 40 MG/1; MG/1; MG/1
1 TABLET ORAL EVERY 4 HOURS PRN
Qty: 30 TABLET | Refills: 0 | Status: SHIPPED | OUTPATIENT
Start: 2022-09-19 | End: 2022-10-19

## 2022-09-19 RX ORDER — CARBAMAZEPINE 200 MG/1
400 CAPSULE, EXTENDED RELEASE ORAL 2 TIMES DAILY
Qty: 240 CAPSULE | Refills: 3 | Status: SHIPPED | OUTPATIENT
Start: 2022-09-19 | End: 2023-03-22

## 2022-09-20 NOTE — PROGRESS NOTES
"  CC: Follow up for migraine                                                                                                                                      HPI:   Johan presents today with the following.    Problem   Seizure (Hcc)   Chronic Cluster Headache, Not Intractable       Current Outpatient Medications   Medication Sig Dispense Refill    carbamazepine (CARBATROL) 200 MG CR capsule Take 2 Capsules by mouth 2 times a day. 240 Capsule 3    propranolol (INDERAL) 10 MG Tab Take 1 Tablet by mouth 3 times a day as needed (anxiety). 90 Tablet 1    hydrOXYzine HCl (ATARAX) 25 MG Tab Take 1 Tablet by mouth 3 times a day as needed for Anxiety. 90 Tablet 1    butalbital/apap/caffeine (FIORICET) -40 mg Tab Take 1 Tablet by mouth every four hours as needed for Headache or Migraine for up to 30 days. Do not take more than 2 doses in 24 hours or more than 3 days in a week. 30 Tablet 0    amLODIPine (NORVASC) 10 MG Tab Take 1 Tablet by mouth every day. 90 Tablet 3    cyclobenzaprine (FLEXERIL) 5 mg tablet Take 1-2 Tablets by mouth 3 times a day as needed for Moderate Pain or Muscle Spasms. 90 Tablet 3    coenzyme Q-10 30 MG capsule Take 60 mg by mouth every day.      B Complex Vitamins (B COMPLEX 1 PO) Take 1 Tablet by mouth every day.      Multiple Vitamins-Minerals (EMERGEN-C IMMUNE PLUS PO) Take 1 Tablet by mouth every day.      vitamin D3 (CHOLECALCIFEROL) 1000 Unit (25 mcg) Tab Take 2,000 Units by mouth every day.       No current facility-administered medications for this visit.       Allergies as of 09/19/2022    (No Known Allergies)        ROS:  All systems negative expect as addressed in assessment and plan.     /78   Pulse 83   Temp 36.7 °C (98.1 °F) (Temporal)   Ht 1.905 m (6' 3\")   Wt (!) 252 kg (556 lb)   SpO2 97%   BMI 69.50 kg/m²     Physical Exam:  Gen:         Alert and oriented, No apparent distress.  Neck:        No Lymphadenopathy.   Lungs:     Clear to auscultation bilaterally. " No wheezes, rales, or rhonchi.   CV:          Regular rate and rhythm. No murmurs, rubs or gallops.         Ext:          No clubbing, cyanosis, or peripheral edema.  Skin:  All visible skin intact without lesions.       Assessment and Plan:  47 y.o. male with the following issues.    1. Chronic cluster headache, not intractable  butalbital/apap/caffeine (FIORICET) -40 mg Tab      2. Swelling of lower leg  US-EXTREMITY VENOUS LOWER UNILAT RIGHT      3. Need for vaccination  Hep B Adult 20+    Influenza Vaccine Quad Injection (PF)      4. Seizure (HCC)             Chronic cluster headache, not intractable  This is a chronic condition. Patient reports that the Imitrex gives his stomach upset. Will try Fioricet as an abortive as he is on carbamazepine for seizures. He has been having periods where he has 10 headaches n a 3 week period.     Obtained and reviewed patient utilization report from [facility] pharmacy database on 9/19/2022 6:09 PM  prior to writing prescription for controlled substance II, III or IV per [place] bill . Based on assessment of the report,my physical exam if necessary and the patient's health problem, the prescription is medically necessary.     Will provide with fiorecet. Discussed not using more than 3 day in a week to minimize rebound headaches.     Seizure (HCC)  This is a chronic stable condition.  Patient has not had a seizure for approximately 20 years.  Patient is stable on carbamazepine.  Patient was following with neurology to evaluate whether or not he needed to continue this medication.  Due to patient's schedule and inability to schedule with his neurologist he was unable to follow-up.  Patient is continuing this medication at this time.    Patient advised to follow-up with neurology when he is able.      Return in about 3 months (around 12/19/2022) for follow up for headaches and leg swelling.      I have placed the below orders and discussed them with an approved  delegating provider.  The MA is performing the below orders under the direction of Dr. jacobs.    Please note that this dictation was created using voice recognition software. I have worked with consultants from the vendor as well as technical experts from [facility] to optimize the interface. I have made every reasonable attempt to correct obvious errors, but I expect that there are errors of grammar and possibly content that I did not discover before finalizing the note.

## 2022-09-20 NOTE — ASSESSMENT & PLAN NOTE
This is a chronic condition. Patient reports that the Imitrex gives his stomach upset. Will try Fioricet as an abortive as he is on carbamazepine for seizures. He has been having periods where he has 10 headaches n a 3 week period.     Obtained and reviewed patient utilization report from [facility] pharmacy database on 9/19/2022 6:09 PM  prior to writing prescription for controlled substance II, III or IV per [place] bill . Based on assessment of the report,my physical exam if necessary and the patient's health problem, the prescription is medically necessary.     Will provide with fiorecet. Discussed not using more than 3 day in a week to minimize rebound headaches.

## 2022-09-26 NOTE — ASSESSMENT & PLAN NOTE
This is a chronic stable condition.  Patient has not had a seizure for approximately 20 years.  Patient is stable on carbamazepine.  Patient was following with neurology to evaluate whether or not he needed to continue this medication.  Due to patient's schedule and inability to schedule with his neurologist he was unable to follow-up.  Patient is continuing this medication at this time.    Patient advised to follow-up with neurology when he is able.

## 2022-10-24 ENCOUNTER — APPOINTMENT (OUTPATIENT)
Dept: MEDICAL GROUP | Facility: PHYSICIAN GROUP | Age: 47
End: 2022-10-24
Payer: COMMERCIAL

## 2022-10-31 ENCOUNTER — NON-PROVIDER VISIT (OUTPATIENT)
Dept: MEDICAL GROUP | Facility: PHYSICIAN GROUP | Age: 47
End: 2022-10-31
Payer: COMMERCIAL

## 2022-10-31 ENCOUNTER — APPOINTMENT (OUTPATIENT)
Dept: MEDICAL GROUP | Facility: PHYSICIAN GROUP | Age: 47
End: 2022-10-31
Payer: COMMERCIAL

## 2022-10-31 DIAGNOSIS — Z23 NEED FOR VACCINATION: ICD-10-CM

## 2022-10-31 PROCEDURE — 90746 HEPB VACCINE 3 DOSE ADULT IM: CPT | Performed by: FAMILY MEDICINE

## 2022-10-31 PROCEDURE — 90471 IMMUNIZATION ADMIN: CPT | Performed by: FAMILY MEDICINE

## 2022-10-31 NOTE — PROGRESS NOTES
"Johan Wellington is a 47 y.o. male here for a non-provider visit for:   HEPATITIS B 2 of 3    Reason for immunization: Overdue/Provider Recommended  Immunization records indicate need for vaccine: Yes, confirmed with Epic  Minimum interval has been met for this vaccine: Yes  ABN completed: Not Indicated    VIS Dated  4/19 was given to patient: Yes  All IAC Questionnaire questions were answered \"No.\"    Patient tolerated injection and no adverse effects were observed or reported: Yes    Pt scheduled for next dose in series: Not Indicated    "

## 2022-11-17 ENCOUNTER — OFFICE VISIT (OUTPATIENT)
Dept: URGENT CARE | Facility: CLINIC | Age: 47
End: 2022-11-17
Payer: COMMERCIAL

## 2022-11-17 VITALS
HEART RATE: 88 BPM | BODY MASS INDEX: 39.17 KG/M2 | TEMPERATURE: 98.9 F | OXYGEN SATURATION: 95 % | DIASTOLIC BLOOD PRESSURE: 84 MMHG | SYSTOLIC BLOOD PRESSURE: 136 MMHG | HEIGHT: 75 IN | WEIGHT: 315 LBS | RESPIRATION RATE: 16 BRPM

## 2022-11-17 DIAGNOSIS — J06.9 URI WITH COUGH AND CONGESTION: ICD-10-CM

## 2022-11-17 LAB
EXTERNAL QUALITY CONTROL: NORMAL
INT CON NEG: NORMAL
INT CON POS: NORMAL
SARS-COV+SARS-COV-2 AG RESP QL IA.RAPID: NEGATIVE

## 2022-11-17 PROCEDURE — 87426 SARSCOV CORONAVIRUS AG IA: CPT | Performed by: NURSE PRACTITIONER

## 2022-11-17 PROCEDURE — 99213 OFFICE O/P EST LOW 20 MIN: CPT | Performed by: NURSE PRACTITIONER

## 2022-11-17 ASSESSMENT — ENCOUNTER SYMPTOMS
FEVER: 0
WHEEZING: 0
DIARRHEA: 0
SHORTNESS OF BREATH: 0
COUGH: 1
VOMITING: 0
NAUSEA: 0
SORE THROAT: 0

## 2022-11-17 NOTE — PROGRESS NOTES
"  Subjective:     Johan Wellington is a 47 y.o. male who presents for Nasal Congestion (Started Tuesday, \"Fatigue, slight cough. Negative Covid test. I also need a doctors note for missing the last three days of work.')      Started Tuesday. Had a negative home COVID test. Taking OTC mucus relief.     Cough  This is a new problem. The current episode started in the past 7 days. The problem has been gradually improving. Associated symptoms include nasal congestion. Pertinent negatives include no ear pain, fever, rash, sore throat, shortness of breath or wheezing.     No past medical history on file.    No past surgical history on file.    Social History     Socioeconomic History    Marital status:      Spouse name: Not on file    Number of children: Not on file    Years of education: Not on file    Highest education level: Not on file   Occupational History    Not on file   Tobacco Use    Smoking status: Never    Smokeless tobacco: Never   Vaping Use    Vaping Use: Never used   Substance and Sexual Activity    Alcohol use: Not Currently    Drug use: Not on file    Sexual activity: Not on file   Other Topics Concern    Not on file   Social History Narrative    Not on file     Social Determinants of Health     Financial Resource Strain: Not on file   Food Insecurity: Not on file   Transportation Needs: Not on file   Physical Activity: Not on file   Stress: Not on file   Social Connections: Not on file   Intimate Partner Violence: Not on file   Housing Stability: Not on file        Family History   Problem Relation Age of Onset    Heart Disease Mother     Arthritis Mother     Hypertension Mother     Diabetes Mother     Cancer Father     No Known Problems Sister         No Known Allergies    Review of Systems   Constitutional:  Positive for malaise/fatigue. Negative for fever.   HENT:  Positive for congestion. Negative for ear pain and sore throat.    Respiratory:  Positive for cough. Negative for shortness " "of breath and wheezing.    Gastrointestinal:  Negative for diarrhea, nausea and vomiting.   Skin:  Negative for rash.   All other systems reviewed and are negative.     Objective:   /84 (BP Location: Left arm, Patient Position: Sitting, BP Cuff Size: Adult) Comment (BP Location): forearm  Pulse 88   Temp 37.2 °C (98.9 °F) (Temporal)   Resp 16   Ht 1.905 m (6' 3\")   Wt (!) 227 kg (500 lb)   SpO2 95%   BMI 62.50 kg/m²     Physical Exam  Vitals reviewed.   Constitutional:       General: He is not in acute distress.     Appearance: He is well-developed. He is not toxic-appearing.   HENT:      Head: Normocephalic and atraumatic.      Right Ear: Ear canal and external ear normal. Tympanic membrane is not erythematous.      Left Ear: Ear canal and external ear normal. Tympanic membrane is not erythematous.      Nose: Mucosal edema present.      Right Turbinates: Swollen.      Left Turbinates: Swollen.      Mouth/Throat:      Mouth: Mucous membranes are moist.      Pharynx: Posterior oropharyngeal erythema present.   Eyes:      Conjunctiva/sclera: Conjunctivae normal.   Cardiovascular:      Rate and Rhythm: Normal rate.   Pulmonary:      Effort: Pulmonary effort is normal. No respiratory distress.      Breath sounds: Normal breath sounds. No stridor. No wheezing, rhonchi or rales.   Musculoskeletal:         General: Normal range of motion.      Cervical back: Normal range of motion and neck supple.   Skin:     General: Skin is warm and dry.      Findings: No rash.   Neurological:      General: No focal deficit present.      Mental Status: He is alert and oriented to person, place, and time.      GCS: GCS eye subscore is 4. GCS verbal subscore is 5. GCS motor subscore is 6.   Psychiatric:         Mood and Affect: Mood normal.         Speech: Speech normal.         Behavior: Behavior normal.         Thought Content: Thought content normal.         Judgment: Judgment normal.       Assessment/Plan:   1. URI with " cough and congestion  - POCT SARS-COV Antigen VIRGILIO (Symptomatic only)  Results for orders placed or performed in visit on 11/17/22   POCT SARS-COV Antigen VIRGILIO (Symptomatic only)   Result Value Ref Range    Internal  Valid     SARS-COV ANTIGEN VIRGILIO Negative Negative, Indeterminate, None Detected, Not Detected, Detected, NotDetected, Valid, Invalid, Pass    Internal Control Positive Valid     Internal Control Negative Valid    Symptomatic care.  -Oral hydration and rest.   -Cough control: nonpharmacologic options for cough relief such as throat lozenges, hot tea, honey.  -Over the counter expectorant as directed; Guaifenesin (Mucinex).  -Tylenol or ibuprofen for pain and fever as directed.   -Warm salt water gargles.  -OTC Throat lozenges or spray (Cepacol).    Seek emergency medical care immediately for: Trouble breathing, persistent pain or pressure in the chest, confusion, inability to wake or stay awake, bluish lips or face, persistent tachycardia (fast heart rate), prolonged dizziness, persistent high grade fevers. Follow up for prolonged cough, persistent wheezing, persistent throat pain, difficulty swallowing, persistent fevers, leg swelling, or any other concerns. Follow up with your Primary Care Provider.     -Discussed viral etiology. COVID S&S, and self isolation guidelines. S&S of PNA with follow up. Stable Vitals.    Differential diagnosis, natural history, supportive care, and indications for immediate follow-up discussed.

## 2022-11-17 NOTE — LETTER
November 17, 2022         Patient: Johan Wellington   YOB: 1975   Date of Visit: 11/17/2022           To Whom it May Concern:    Johan Wellington was seen in my clinic on 11/17/2022, please include associated missed work days this week. He may return to work on 11/18/2022.    If you have any questions or concerns, please don't hesitate to call.        Sincerely,           VICKI Burrows.  Electronically Signed

## 2022-11-17 NOTE — LETTER
November 17, 2022    To Whom It May Concern:      Johan Wellington was seen in my clinic on 11/17/2022, please include associated missed work days this week. He may return to work on 11/21/2022.    If you have any questions or concerns, please don't hesitate to call.        Sincerely,        VICKI Burrows  Electronically Signed   [phone]

## 2023-01-20 RX ORDER — AMLODIPINE BESYLATE 10 MG/1
TABLET ORAL
Qty: 90 TABLET | Refills: 3 | Status: SHIPPED | OUTPATIENT
Start: 2023-01-20 | End: 2023-10-23 | Stop reason: SDUPTHER

## 2023-01-20 NOTE — TELEPHONE ENCOUNTER
Received request via: Pharmacy    Was the patient seen in the last year in this department? Yes    Does the patient have an active prescription (recently filled or refills available) for medication(s) requested? No    Does the patient have skilled nursing Plus and need 100 day supply (blood pressure, diabetes and cholesterol meds only)? Patient does not have SCP

## 2023-03-22 RX ORDER — CARBAMAZEPINE 200 MG/1
CAPSULE, EXTENDED RELEASE ORAL
Qty: 240 CAPSULE | Refills: 5 | Status: SHIPPED | OUTPATIENT
Start: 2023-03-22 | End: 2023-06-21 | Stop reason: SDUPTHER

## 2023-06-21 ENCOUNTER — OFFICE VISIT (OUTPATIENT)
Dept: MEDICAL GROUP | Facility: PHYSICIAN GROUP | Age: 48
End: 2023-06-21
Payer: COMMERCIAL

## 2023-06-21 VITALS
DIASTOLIC BLOOD PRESSURE: 110 MMHG | OXYGEN SATURATION: 94 % | SYSTOLIC BLOOD PRESSURE: 152 MMHG | HEART RATE: 101 BPM | RESPIRATION RATE: 20 BRPM | HEIGHT: 75 IN | WEIGHT: 315 LBS | BODY MASS INDEX: 39.17 KG/M2 | TEMPERATURE: 97.6 F

## 2023-06-21 DIAGNOSIS — E66.01 MORBID OBESITY WITH BMI OF 70 AND OVER, ADULT (HCC): ICD-10-CM

## 2023-06-21 DIAGNOSIS — I10 PRIMARY HYPERTENSION: ICD-10-CM

## 2023-06-21 DIAGNOSIS — G44.029 CHRONIC CLUSTER HEADACHE, NOT INTRACTABLE: ICD-10-CM

## 2023-06-21 DIAGNOSIS — R73.03 PREDIABETES: ICD-10-CM

## 2023-06-21 DIAGNOSIS — Z13.21 ENCOUNTER FOR VITAMIN DEFICIENCY SCREENING: ICD-10-CM

## 2023-06-21 DIAGNOSIS — E55.9 VITAMIN D DEFICIENCY: ICD-10-CM

## 2023-06-21 DIAGNOSIS — E78.5 DYSLIPIDEMIA: ICD-10-CM

## 2023-06-21 DIAGNOSIS — Z13.29 SCREENING FOR THYROID DISORDER: ICD-10-CM

## 2023-06-21 DIAGNOSIS — R56.9 SEIZURE (HCC): ICD-10-CM

## 2023-06-21 PROCEDURE — 3080F DIAST BP >= 90 MM HG: CPT | Performed by: NURSE PRACTITIONER

## 2023-06-21 PROCEDURE — 99214 OFFICE O/P EST MOD 30 MIN: CPT | Mod: 25 | Performed by: NURSE PRACTITIONER

## 2023-06-21 PROCEDURE — 3077F SYST BP >= 140 MM HG: CPT | Performed by: NURSE PRACTITIONER

## 2023-06-21 PROCEDURE — 99080 SPECIAL REPORTS OR FORMS: CPT | Performed by: NURSE PRACTITIONER

## 2023-06-21 RX ORDER — CYCLOBENZAPRINE HCL 5 MG
5-10 TABLET ORAL 3 TIMES DAILY PRN
Qty: 90 TABLET | Refills: 3 | Status: SHIPPED | OUTPATIENT
Start: 2023-06-21

## 2023-06-21 RX ORDER — METFORMIN HYDROCHLORIDE 500 MG/1
500 TABLET, EXTENDED RELEASE ORAL DAILY
Qty: 90 TABLET | Refills: 3 | Status: SHIPPED | OUTPATIENT
Start: 2023-06-21

## 2023-06-21 RX ORDER — CARBAMAZEPINE 200 MG/1
400 CAPSULE, EXTENDED RELEASE ORAL 2 TIMES DAILY
Qty: 360 CAPSULE | Refills: 3 | Status: SHIPPED | OUTPATIENT
Start: 2023-06-21 | End: 2024-02-13 | Stop reason: SDUPTHER

## 2023-06-21 ASSESSMENT — PATIENT HEALTH QUESTIONNAIRE - PHQ9
5. POOR APPETITE OR OVEREATING: 1 - SEVERAL DAYS
SUM OF ALL RESPONSES TO PHQ QUESTIONS 1-9: 7
CLINICAL INTERPRETATION OF PHQ2 SCORE: 2

## 2023-06-21 NOTE — ASSESSMENT & PLAN NOTE
Chronic stable. Patient does have an elevated BP in office today however he does have a headache today.     Patient to continue amlodipine 10mg daily.

## 2023-06-21 NOTE — ASSESSMENT & PLAN NOTE
Chronic condition. Patient is stable on carbamazepine 400mg BID. He has seen neurology in the past.     Patient to continue carbamazpine 400mg BID.

## 2023-06-21 NOTE — PROGRESS NOTES
"  Chief Complaint   Patient presents with    Headache    Leg Swelling     Fallow up     Hypertension    Paperwork                                                                                                                                       HPI:   Johan presents today with the following.    Problem   Morbid Obesity With Bmi of 70 and Over, Adult (Hcc)   Seizure (Hcc)   Primary Hypertension   Chronic Cluster Headache, Not Intractable       Current Outpatient Medications   Medication Sig Dispense Refill    carbamazepine (CARBATROL) 200 MG CR capsule Take 2 Capsules by mouth 2 times a day. 360 Capsule 3    cyclobenzaprine (FLEXERIL) 5 mg tablet Take 1-2 Tablets by mouth 3 times a day as needed for Moderate Pain or Muscle Spasms. 90 Tablet 3    metFORMIN ER (GLUCOPHAGE XR) 500 MG TABLET SR 24 HR Take 1 Tablet by mouth every day. 90 Tablet 3    amLODIPine (NORVASC) 10 MG Tab TAKE 1 TABLET DAILY 90 Tablet 3    propranolol (INDERAL) 10 MG Tab Take 1 Tablet by mouth 3 times a day as needed (anxiety). 90 Tablet 1    hydrOXYzine HCl (ATARAX) 25 MG Tab Take 1 Tablet by mouth 3 times a day as needed for Anxiety. 90 Tablet 1    coenzyme Q-10 30 MG capsule Take 2 Capsules by mouth every day.      B Complex Vitamins (B COMPLEX 1 PO) Take 1 Tablet by mouth every day.      Multiple Vitamins-Minerals (EMERGEN-C IMMUNE PLUS PO) Take 1 Tablet by mouth every day.      vitamin D3 (CHOLECALCIFEROL) 1000 Unit (25 mcg) Tab Take 2 Tablets by mouth every day.       No current facility-administered medications for this visit.       Allergies as of 06/21/2023    (No Known Allergies)        ROS:  All systems negative expect as addressed in assessment and plan.     BP (!) 152/110 (BP Location: Other (Comment), Patient Position: Sitting, BP Cuff Size: Adult) Comment: headache Comment (BP Location): left for arm  Pulse (!) 101   Temp 36.4 °C (97.6 °F) (Temporal)   Resp 20   Ht 1.905 m (6' 3\")   Wt (!) 256 kg (565 lb)   SpO2 94%   " BMI 70.62 kg/m²       Physical Exam  Vitals reviewed.   Constitutional:       Appearance: Normal appearance.   HENT:      Head: Normocephalic and atraumatic.      Mouth/Throat:      Mouth: Mucous membranes are moist.   Eyes:      Extraocular Movements: Extraocular movements intact.      Conjunctiva/sclera: Conjunctivae normal.   Pulmonary:      Effort: Pulmonary effort is normal.   Musculoskeletal:         General: Normal range of motion.      Cervical back: Normal range of motion.   Skin:     General: Skin is warm and dry.   Neurological:      General: No focal deficit present.      Mental Status: He is alert and oriented to person, place, and time.   Psychiatric:         Mood and Affect: Mood normal.         Behavior: Behavior normal.         Thought Content: Thought content normal.           Assessment and Plan:  48 y.o. male with the following issues.    1. Chronic cluster headache, not intractable        2. Primary hypertension  Comp Metabolic Panel      3. Seizure (HCC)        4. Prediabetes  Comp Metabolic Panel    HEMOGLOBIN A1C      5. Vitamin D deficiency  VITAMIN D,25 HYDROXY (DEFICIENCY)      6. Screening for thyroid disorder  FREE THYROXINE    TSH      7. Dyslipidemia  Lipid Profile      8. Morbid obesity with BMI of 70 and over, adult (HCC)        9. Encounter for vitamin deficiency screening  VITAMIN B12    CBC WITHOUT DIFFERENTIAL           Chronic cluster headache, not intractable  Chronic condition. Patient reports that he has a mild headache today. He states that his headaches have not worsened. He will go several months without headaches and then have a week of cluster headaches.     Patient to continue OTC pain relievers.     Patient needs a renewal of his FMLA. Paperwork completed.     Primary hypertension  Chronic stable. Patient does have an elevated BP in office today however he does have a headache today.     Patient to continue amlodipine 10mg daily.     Seizure (HCC)  Chronic condition.  Patient is stable on carbamazepine 400mg BID. He has seen neurology in the past.     Patient to continue carbamazpine 400mg BID.       Return for Chronic Health Conditions.      Please note that this dictation was created using voice recognition software. I have worked with consultants from the vendor as well as technical experts from [facility] to optimize the interface. I have made every reasonable attempt to correct obvious errors, but I expect that there are errors of grammar and possibly content that I did not discover before finalizing the note.

## 2023-06-21 NOTE — ASSESSMENT & PLAN NOTE
Chronic condition. Patient reports that he has a mild headache today. He states that his headaches have not worsened. He will go several months without headaches and then have a week of cluster headaches.     Patient to continue OTC pain relievers.     Patient needs a renewal of his FMLA. Paperwork completed.

## 2023-06-26 ENCOUNTER — NON-PROVIDER VISIT (OUTPATIENT)
Dept: MEDICAL GROUP | Facility: PHYSICIAN GROUP | Age: 48
End: 2023-06-26
Payer: COMMERCIAL

## 2023-06-26 DIAGNOSIS — Z23 NEED FOR VACCINATION: ICD-10-CM

## 2023-06-26 PROCEDURE — 90471 IMMUNIZATION ADMIN: CPT | Performed by: NURSE PRACTITIONER

## 2023-06-26 PROCEDURE — 90746 HEPB VACCINE 3 DOSE ADULT IM: CPT | Performed by: NURSE PRACTITIONER

## 2023-06-26 NOTE — NON-PROVIDER
"Johan Wellington is a 48 y.o. male here for a non-provider visit for:   HEPATITIS B 3 of 3    Reason for immunization: Annual Flu Vaccine  Immunization records indicate need for vaccine: Yes, confirmed with Epic  Minimum interval has been met for this vaccine: Yes  ABN completed: Not Indicated    VIS Dated  06/26/2023 was given to patient: Yes  All IAC Questionnaire questions were answered \"No.\"    Patient tolerated injection and no adverse effects were observed or reported: Yes    Pt scheduled for next dose in series: Not Indicated   "

## 2023-06-29 ENCOUNTER — TELEPHONE (OUTPATIENT)
Dept: MEDICAL GROUP | Facility: PHYSICIAN GROUP | Age: 48
End: 2023-06-29
Payer: COMMERCIAL

## 2023-06-29 DIAGNOSIS — G44.029 CHRONIC CLUSTER HEADACHE, NOT INTRACTABLE: ICD-10-CM

## 2023-06-29 NOTE — TELEPHONE ENCOUNTER
*Pt requesting refill on Butalbital*      Received request via: Patient    Was the patient seen in the last year in this department? Yes    Does the patient have an active prescription (recently filled or refills available) for medication(s) requested? No    Does the patient have custodial Plus and need 100 day supply (blood pressure, diabetes and cholesterol meds only)? Patient does not have SCP

## 2023-06-30 RX ORDER — BUTALBITAL, ACETAMINOPHEN AND CAFFEINE 50; 325; 40 MG/1; MG/1; MG/1
1 TABLET ORAL EVERY 4 HOURS PRN
Qty: 30 TABLET | Refills: 0 | Status: SHIPPED | OUTPATIENT
Start: 2023-06-30 | End: 2023-07-30

## 2023-07-22 ENCOUNTER — HOSPITAL ENCOUNTER (OUTPATIENT)
Dept: LAB | Facility: MEDICAL CENTER | Age: 48
End: 2023-07-22
Attending: NURSE PRACTITIONER
Payer: COMMERCIAL

## 2023-07-22 DIAGNOSIS — Z13.21 ENCOUNTER FOR VITAMIN DEFICIENCY SCREENING: ICD-10-CM

## 2023-07-22 DIAGNOSIS — E55.9 VITAMIN D DEFICIENCY: ICD-10-CM

## 2023-07-22 DIAGNOSIS — R73.03 PREDIABETES: ICD-10-CM

## 2023-07-22 DIAGNOSIS — I10 PRIMARY HYPERTENSION: ICD-10-CM

## 2023-07-22 DIAGNOSIS — E78.5 DYSLIPIDEMIA: ICD-10-CM

## 2023-07-22 DIAGNOSIS — Z13.29 SCREENING FOR THYROID DISORDER: ICD-10-CM

## 2023-07-22 LAB
25(OH)D3 SERPL-MCNC: 56 NG/ML (ref 30–100)
ALBUMIN SERPL BCP-MCNC: 4.1 G/DL (ref 3.2–4.9)
ALBUMIN/GLOB SERPL: 1.1 G/DL
ALP SERPL-CCNC: 101 U/L (ref 30–99)
ALT SERPL-CCNC: 27 U/L (ref 2–50)
ANION GAP SERPL CALC-SCNC: 11 MMOL/L (ref 7–16)
AST SERPL-CCNC: 26 U/L (ref 12–45)
BILIRUB SERPL-MCNC: 0.2 MG/DL (ref 0.1–1.5)
BUN SERPL-MCNC: 15 MG/DL (ref 8–22)
CALCIUM ALBUM COR SERPL-MCNC: 8.9 MG/DL (ref 8.5–10.5)
CALCIUM SERPL-MCNC: 9 MG/DL (ref 8.5–10.5)
CHLORIDE SERPL-SCNC: 103 MMOL/L (ref 96–112)
CHOLEST SERPL-MCNC: 174 MG/DL (ref 100–199)
CO2 SERPL-SCNC: 26 MMOL/L (ref 20–33)
CREAT SERPL-MCNC: 1.11 MG/DL (ref 0.5–1.4)
ERYTHROCYTE [DISTWIDTH] IN BLOOD BY AUTOMATED COUNT: 47.4 FL (ref 35.9–50)
EST. AVERAGE GLUCOSE BLD GHB EST-MCNC: 126 MG/DL
FASTING STATUS PATIENT QL REPORTED: NORMAL
GFR SERPLBLD CREATININE-BSD FMLA CKD-EPI: 82 ML/MIN/1.73 M 2
GLOBULIN SER CALC-MCNC: 3.9 G/DL (ref 1.9–3.5)
GLUCOSE SERPL-MCNC: 96 MG/DL (ref 65–99)
HBA1C MFR BLD: 6 % (ref 4–5.6)
HCT VFR BLD AUTO: 42.7 % (ref 42–52)
HDLC SERPL-MCNC: 43 MG/DL
HGB BLD-MCNC: 14.2 G/DL (ref 14–18)
LDLC SERPL CALC-MCNC: 110 MG/DL
MCH RBC QN AUTO: 32.4 PG (ref 27–33)
MCHC RBC AUTO-ENTMCNC: 33.3 G/DL (ref 32.3–36.5)
MCV RBC AUTO: 97.5 FL (ref 81.4–97.8)
PLATELET # BLD AUTO: 190 K/UL (ref 164–446)
PMV BLD AUTO: 10.8 FL (ref 9–12.9)
POTASSIUM SERPL-SCNC: 4.2 MMOL/L (ref 3.6–5.5)
PROT SERPL-MCNC: 8 G/DL (ref 6–8.2)
RBC # BLD AUTO: 4.38 M/UL (ref 4.7–6.1)
SODIUM SERPL-SCNC: 140 MMOL/L (ref 135–145)
T4 FREE SERPL-MCNC: 1.11 NG/DL (ref 0.93–1.7)
TRIGL SERPL-MCNC: 107 MG/DL (ref 0–149)
TSH SERPL DL<=0.005 MIU/L-ACNC: 2.24 UIU/ML (ref 0.38–5.33)
VIT B12 SERPL-MCNC: 2204 PG/ML (ref 211–911)
WBC # BLD AUTO: 7 K/UL (ref 4.8–10.8)

## 2023-07-22 PROCEDURE — 84443 ASSAY THYROID STIM HORMONE: CPT

## 2023-07-22 PROCEDURE — 80061 LIPID PANEL: CPT

## 2023-07-22 PROCEDURE — 36415 COLL VENOUS BLD VENIPUNCTURE: CPT

## 2023-07-22 PROCEDURE — 85027 COMPLETE CBC AUTOMATED: CPT

## 2023-07-22 PROCEDURE — 84439 ASSAY OF FREE THYROXINE: CPT

## 2023-07-22 PROCEDURE — 83036 HEMOGLOBIN GLYCOSYLATED A1C: CPT

## 2023-07-22 PROCEDURE — 82607 VITAMIN B-12: CPT

## 2023-07-22 PROCEDURE — 80053 COMPREHEN METABOLIC PANEL: CPT

## 2023-07-22 PROCEDURE — 82306 VITAMIN D 25 HYDROXY: CPT

## 2023-07-25 ENCOUNTER — OFFICE VISIT (OUTPATIENT)
Dept: MEDICAL GROUP | Facility: PHYSICIAN GROUP | Age: 48
End: 2023-07-25
Payer: COMMERCIAL

## 2023-07-25 VITALS
HEART RATE: 99 BPM | OXYGEN SATURATION: 93 % | SYSTOLIC BLOOD PRESSURE: 180 MMHG | WEIGHT: 315 LBS | HEIGHT: 75 IN | DIASTOLIC BLOOD PRESSURE: 126 MMHG | BODY MASS INDEX: 39.17 KG/M2 | TEMPERATURE: 96.8 F

## 2023-07-25 DIAGNOSIS — R73.03 PREDIABETES: ICD-10-CM

## 2023-07-25 DIAGNOSIS — I10 PRIMARY HYPERTENSION: ICD-10-CM

## 2023-07-25 DIAGNOSIS — R56.9 SEIZURE (HCC): ICD-10-CM

## 2023-07-25 PROCEDURE — 3080F DIAST BP >= 90 MM HG: CPT | Performed by: NURSE PRACTITIONER

## 2023-07-25 PROCEDURE — 99214 OFFICE O/P EST MOD 30 MIN: CPT | Performed by: NURSE PRACTITIONER

## 2023-07-25 PROCEDURE — 3077F SYST BP >= 140 MM HG: CPT | Performed by: NURSE PRACTITIONER

## 2023-07-25 RX ORDER — SEMAGLUTIDE 0.68 MG/ML
0.5 INJECTION, SOLUTION SUBCUTANEOUS
Qty: 9 ML | Refills: 3 | Status: SHIPPED | OUTPATIENT
Start: 2023-07-25 | End: 2023-10-23

## 2023-07-25 RX ORDER — HYDROCHLOROTHIAZIDE 50 MG/1
50 TABLET ORAL DAILY
Qty: 90 TABLET | Refills: 3 | Status: SHIPPED | OUTPATIENT
Start: 2023-07-25

## 2023-07-25 ASSESSMENT — FIBROSIS 4 INDEX: FIB4 SCORE: 1.24

## 2023-07-25 NOTE — ASSESSMENT & PLAN NOTE
Chronic stable. Patient was recently started on metformin due to an A1c of 6.0%. he is tolerating this well.  As discussed, will start ozempic to help with blood sugar regulation and weight loss.     Continue metformin ER 500mg daily. Will start ozempic 0.25mg weekly.

## 2023-07-25 NOTE — PROGRESS NOTES
Chief Complaint   Patient presents with    Lab Results                                                                                                                                       HPI:   Johan presents today with the following.    Problem   Prediabetes   Primary Hypertension       Current Outpatient Medications   Medication Sig Dispense Refill    hydroCHLOROthiazide (HYDRODIURIL) 50 MG Tab Take 1 Tablet by mouth every day. 90 Tablet 3    Semaglutide,0.25 or 0.5MG/DOS, (OZEMPIC, 0.25 OR 0.5 MG/DOSE,) 2 MG/3ML Solution Pen-injector Inject 0.5 mg under the skin every 7 days. 9 mL 3    butalbital/apap/caffeine (FIORICET) -40 mg Tab Take 1 Tablet by mouth every four hours as needed for Headache or Migraine for up to 30 days. Do not take more than 2 tablets in 24 hours or more than 3 days in a week. 30 Tablet 0    carbamazepine (CARBATROL) 200 MG CR capsule Take 2 Capsules by mouth 2 times a day. 360 Capsule 3    cyclobenzaprine (FLEXERIL) 5 mg tablet Take 1-2 Tablets by mouth 3 times a day as needed for Moderate Pain or Muscle Spasms. 90 Tablet 3    metFORMIN ER (GLUCOPHAGE XR) 500 MG TABLET SR 24 HR Take 1 Tablet by mouth every day. 90 Tablet 3    amLODIPine (NORVASC) 10 MG Tab TAKE 1 TABLET DAILY 90 Tablet 3    propranolol (INDERAL) 10 MG Tab Take 1 Tablet by mouth 3 times a day as needed (anxiety). 90 Tablet 1    hydrOXYzine HCl (ATARAX) 25 MG Tab Take 1 Tablet by mouth 3 times a day as needed for Anxiety. 90 Tablet 1    coenzyme Q-10 30 MG capsule Take 2 Capsules by mouth every day.      B Complex Vitamins (B COMPLEX 1 PO) Take 1 Tablet by mouth every day.      Multiple Vitamins-Minerals (EMERGEN-C IMMUNE PLUS PO) Take 1 Tablet by mouth every day.      vitamin D3 (CHOLECALCIFEROL) 1000 Unit (25 mcg) Tab Take 2 Tablets by mouth every day.       No current facility-administered medications for this visit.       Allergies as of 07/25/2023    (No Known Allergies)        ROS:  All systems negative  "expect as addressed in assessment and plan.     BP (!) 180/126 (BP Location: Left arm, Patient Position: Sitting, BP Cuff Size: Adult long)   Pulse 99   Temp 36 °C (96.8 °F) (Temporal)   Ht 1.905 m (6' 3\")   Wt (!) 259 kg (570 lb)   SpO2 93%   BMI 71.25 kg/m²       Physical Exam  Vitals reviewed.   Constitutional:       Appearance: Normal appearance.   HENT:      Head: Normocephalic and atraumatic.      Mouth/Throat:      Mouth: Mucous membranes are moist.   Eyes:      Extraocular Movements: Extraocular movements intact.      Conjunctiva/sclera: Conjunctivae normal.   Pulmonary:      Effort: Pulmonary effort is normal.   Musculoskeletal:         General: Normal range of motion.      Cervical back: Normal range of motion.   Skin:     General: Skin is warm and dry.   Neurological:      General: No focal deficit present.      Mental Status: He is alert and oriented to person, place, and time.   Psychiatric:         Mood and Affect: Mood normal.         Behavior: Behavior normal.         Thought Content: Thought content normal.           Assessment and Plan:  48 y.o. male with the following issues.    1. Primary hypertension  Basic Metabolic Panel      2. Prediabetes        3. Seizure (HCC)  CARBAMAZEPINE           Primary hypertension  Chronic unstable. Patient has had elevated blood pressures over the last 2 months. He has been havign more headaches recently. He denies any chest pain, palpitations.     Patient to continue amlodipine 10mg daily. Will add HCTZ 25-50 mg daily.     Prediabetes  Chronic stable. Patient was recently started on metformin due to an A1c of 6.0%. he is tolerating this well.  As discussed, will start ozempic to help with blood sugar regulation and weight loss.     Continue metformin ER 500mg daily. Will start ozempic 0.25mg weekly.       Return in about 3 months (around 10/25/2023) for Chronic Health Conditions.      Please note that this dictation was created using voice recognition " software. I have worked with consultants from the vendor as well as technical experts from [facility] to optimize the interface. I have made every reasonable attempt to correct obvious errors, but I expect that there are errors of grammar and possibly content that I did not discover before finalizing the note.

## 2023-07-25 NOTE — ASSESSMENT & PLAN NOTE
Chronic unstable. Patient has had elevated blood pressures over the last 2 months. He has been havign more headaches recently. He denies any chest pain, palpitations.     Patient to continue amlodipine 10mg daily. Will add HCTZ 25-50 mg daily.

## 2023-09-06 ENCOUNTER — OFFICE VISIT (OUTPATIENT)
Dept: URGENT CARE | Facility: CLINIC | Age: 48
End: 2023-09-06
Payer: COMMERCIAL

## 2023-09-06 VITALS
SYSTOLIC BLOOD PRESSURE: 130 MMHG | BODY MASS INDEX: 39.17 KG/M2 | HEIGHT: 75 IN | WEIGHT: 315 LBS | HEART RATE: 87 BPM | RESPIRATION RATE: 20 BRPM | OXYGEN SATURATION: 95 % | DIASTOLIC BLOOD PRESSURE: 86 MMHG | TEMPERATURE: 97.8 F

## 2023-09-06 DIAGNOSIS — U07.1 COVID-19: ICD-10-CM

## 2023-09-06 DIAGNOSIS — Z02.89 ENCOUNTER TO OBTAIN EXCUSE FROM WORK: ICD-10-CM

## 2023-09-06 DIAGNOSIS — Z20.822 CLOSE EXPOSURE TO COVID-19 VIRUS: ICD-10-CM

## 2023-09-06 DIAGNOSIS — J06.9 VIRAL URI: ICD-10-CM

## 2023-09-06 LAB
FLUAV RNA SPEC QL NAA+PROBE: NEGATIVE
FLUBV RNA SPEC QL NAA+PROBE: NEGATIVE
RSV RNA SPEC QL NAA+PROBE: NEGATIVE
SARS-COV-2 RNA RESP QL NAA+PROBE: POSITIVE

## 2023-09-06 PROCEDURE — 0241U POCT CEPHEID COV-2, FLU A/B, RSV - PCR: CPT | Performed by: STUDENT IN AN ORGANIZED HEALTH CARE EDUCATION/TRAINING PROGRAM

## 2023-09-06 PROCEDURE — 99213 OFFICE O/P EST LOW 20 MIN: CPT | Performed by: STUDENT IN AN ORGANIZED HEALTH CARE EDUCATION/TRAINING PROGRAM

## 2023-09-06 PROCEDURE — 3075F SYST BP GE 130 - 139MM HG: CPT | Performed by: STUDENT IN AN ORGANIZED HEALTH CARE EDUCATION/TRAINING PROGRAM

## 2023-09-06 PROCEDURE — 3079F DIAST BP 80-89 MM HG: CPT | Performed by: STUDENT IN AN ORGANIZED HEALTH CARE EDUCATION/TRAINING PROGRAM

## 2023-09-06 ASSESSMENT — ENCOUNTER SYMPTOMS
MYALGIAS: 1
CHILLS: 1
CONSTIPATION: 0
SORE THROAT: 1
WHEEZING: 0
DIARRHEA: 0
SHORTNESS OF BREATH: 0
COUGH: 1
PALPITATIONS: 0
VOMITING: 0
FEVER: 1
ABDOMINAL PAIN: 0
NAUSEA: 0

## 2023-09-06 ASSESSMENT — FIBROSIS 4 INDEX: FIB4 SCORE: 1.24

## 2023-09-06 NOTE — LETTER
September 6, 2023    To Whom It May Concern:         This is confirmation that Johan Wellington attended his scheduled appointment with Haritha Cardoza P.A.-C. on 9/06/23. A concern for COVID-19 has been identified and testing is in progress. Johan is currently on day #6 of symptoms. We are asking you to excuse absences while following self-isolation protocol per Center for Disease Control (CDC) guidelines.  Your employee will be able to access test results through our electronic delivery system called [employer].    If the results of testing are negative, and once there has been no fever (temperature >100.4 F) for at least 24 hours without treatment, then return to work is approved. If the results of testing are positive then please excuse absenses per CDC guidelines. In general, repeat testing is not necessary and not offered through our [facility] urgent care.    If you have any questions please do not hesitate to call me at the phone number listed below.       Sincerely,    Haritha Cardoza P.A.-C.  [phone]

## 2023-09-06 NOTE — PROGRESS NOTES
"Subjective     Johan Wellington is a 48 y.o. male who presents with Cold Exposure (Exposed to covid headaches,body aches x 6 days)            Johan is a 48 y.o. male who presents to urgent care for cough, nasal congestion, sore throat and body aches.  Patient states symptoms started on Thursday.  Patient states today is the best day that he has felt as symptoms have gradually improved.  No shortness of breath/wheezing.  Patient mainly presents to urgent care requesting COVID testing.  Patient states that his girlfriend sent positive for COVID in clinic today and patient is now requesting COVID testing. Patient thinks that he might have actually exposedhis girlfriend to COVID, as his symptoms started first.        Review of Systems   Constitutional:  Positive for chills, fever and malaise/fatigue.   HENT:  Positive for congestion and sore throat.    Respiratory:  Positive for cough. Negative for shortness of breath and wheezing.    Cardiovascular:  Negative for chest pain and palpitations.   Gastrointestinal:  Negative for abdominal pain, constipation, diarrhea, nausea and vomiting.   Musculoskeletal:  Positive for myalgias.   All other systems reviewed and are negative.             Objective     /86 (BP Location: Left arm, Patient Position: Sitting, BP Cuff Size: Large adult)   Pulse 87   Temp 36.6 °C (97.8 °F) (Temporal)   Resp 20   Ht 1.905 m (6' 3\")   Wt (!) 259 kg (570 lb)   SpO2 95%   BMI 71.25 kg/m²      Physical Exam  Vitals reviewed.   Constitutional:       General: He is not in acute distress.     Appearance: He is not toxic-appearing.   HENT:      Head: Normocephalic and atraumatic.      Nose: Nose normal.      Mouth/Throat:      Mouth: Mucous membranes are moist.      Pharynx: Oropharynx is clear.   Eyes:      Extraocular Movements: Extraocular movements intact.      Conjunctiva/sclera: Conjunctivae normal.      Pupils: Pupils are equal, round, and reactive to light.   Cardiovascular: "      Rate and Rhythm: Normal rate.   Pulmonary:      Effort: Pulmonary effort is normal.   Skin:     General: Skin is warm and dry.   Neurological:      General: No focal deficit present.      Mental Status: He is alert. Mental status is at baseline.                             Assessment & Plan        1. COVID-19  - Day #6 of symptoms.     2. Viral URI  - POCT CoV-2, Flu A/B, RSV by PCR: POSITIVE COVID-19    3. Close exposure to COVID-19 virus  - POCT CoV-2, Flu A/B, RSV by PCR: POSITIVE COVID-19    4. Encounter to obtain excuse from work  - Work note provided and uploaded into [employer].    Supportive care measures (rest, hydration, OTC medications: tylenol, ibuprofen, DayQuil/NyQuil, cough suppressant, throat lozenges, humidified air) and indications for immediate follow-up discussed with patient. Pathogenesis of diagnosis discussed including typical length and natural progression.      Instructed to return to urgent care or nearest emergency department if symptoms fail to improve, for any change in condition, further concerns, or new concerning symptoms.    Patient states understanding and agrees with the plan of care and discharge instructions.

## 2023-10-21 ENCOUNTER — HOSPITAL ENCOUNTER (OUTPATIENT)
Dept: LAB | Facility: MEDICAL CENTER | Age: 48
End: 2023-10-21
Attending: NURSE PRACTITIONER
Payer: COMMERCIAL

## 2023-10-21 DIAGNOSIS — I10 PRIMARY HYPERTENSION: ICD-10-CM

## 2023-10-21 DIAGNOSIS — R56.9 SEIZURE (HCC): ICD-10-CM

## 2023-10-21 LAB
ANION GAP SERPL CALC-SCNC: 10 MMOL/L (ref 7–16)
BUN SERPL-MCNC: 14 MG/DL (ref 8–22)
CALCIUM SERPL-MCNC: 9.2 MG/DL (ref 8.5–10.5)
CARBAMAZEPINE SERPL-MCNC: 5 UG/ML (ref 4–12)
CHLORIDE SERPL-SCNC: 103 MMOL/L (ref 96–112)
CO2 SERPL-SCNC: 28 MMOL/L (ref 20–33)
CREAT SERPL-MCNC: 0.88 MG/DL (ref 0.5–1.4)
FASTING STATUS PATIENT QL REPORTED: NORMAL
GFR SERPLBLD CREATININE-BSD FMLA CKD-EPI: 106 ML/MIN/1.73 M 2
GLUCOSE SERPL-MCNC: 108 MG/DL (ref 65–99)
POTASSIUM SERPL-SCNC: 4 MMOL/L (ref 3.6–5.5)
SODIUM SERPL-SCNC: 141 MMOL/L (ref 135–145)

## 2023-10-21 PROCEDURE — 36415 COLL VENOUS BLD VENIPUNCTURE: CPT

## 2023-10-21 PROCEDURE — 80156 ASSAY CARBAMAZEPINE TOTAL: CPT

## 2023-10-21 PROCEDURE — 80048 BASIC METABOLIC PNL TOTAL CA: CPT

## 2023-10-23 ENCOUNTER — OFFICE VISIT (OUTPATIENT)
Dept: MEDICAL GROUP | Facility: PHYSICIAN GROUP | Age: 48
End: 2023-10-23
Payer: COMMERCIAL

## 2023-10-23 VITALS
HEIGHT: 75 IN | HEART RATE: 94 BPM | OXYGEN SATURATION: 94 % | DIASTOLIC BLOOD PRESSURE: 82 MMHG | SYSTOLIC BLOOD PRESSURE: 122 MMHG | TEMPERATURE: 97.2 F | WEIGHT: 315 LBS | BODY MASS INDEX: 39.17 KG/M2

## 2023-10-23 DIAGNOSIS — Z12.11 ENCOUNTER FOR SCREENING FOR COLORECTAL MALIGNANT NEOPLASM: ICD-10-CM

## 2023-10-23 DIAGNOSIS — Z12.12 ENCOUNTER FOR SCREENING FOR COLORECTAL MALIGNANT NEOPLASM: ICD-10-CM

## 2023-10-23 DIAGNOSIS — Z23 NEED FOR VACCINATION: ICD-10-CM

## 2023-10-23 DIAGNOSIS — R73.03 PREDIABETES: ICD-10-CM

## 2023-10-23 DIAGNOSIS — G44.029 CHRONIC CLUSTER HEADACHE, NOT INTRACTABLE: ICD-10-CM

## 2023-10-23 DIAGNOSIS — E66.01 MORBID OBESITY WITH BMI OF 70 AND OVER, ADULT (HCC): ICD-10-CM

## 2023-10-23 DIAGNOSIS — R56.9 SEIZURE (HCC): ICD-10-CM

## 2023-10-23 PROCEDURE — 3079F DIAST BP 80-89 MM HG: CPT | Performed by: NURSE PRACTITIONER

## 2023-10-23 PROCEDURE — 3074F SYST BP LT 130 MM HG: CPT | Performed by: NURSE PRACTITIONER

## 2023-10-23 PROCEDURE — 90686 IIV4 VACC NO PRSV 0.5 ML IM: CPT | Performed by: NURSE PRACTITIONER

## 2023-10-23 PROCEDURE — 90471 IMMUNIZATION ADMIN: CPT | Performed by: NURSE PRACTITIONER

## 2023-10-23 PROCEDURE — 99214 OFFICE O/P EST MOD 30 MIN: CPT | Mod: 25 | Performed by: NURSE PRACTITIONER

## 2023-10-23 RX ORDER — BUTALBITAL, ACETAMINOPHEN AND CAFFEINE 50; 325; 40 MG/1; MG/1; MG/1
1 TABLET ORAL EVERY 4 HOURS PRN
Qty: 30 TABLET | Refills: 2 | Status: SHIPPED | OUTPATIENT
Start: 2023-10-23 | End: 2024-01-21

## 2023-10-23 RX ORDER — AMLODIPINE BESYLATE 10 MG/1
10 TABLET ORAL DAILY
Qty: 90 TABLET | Refills: 3 | Status: SHIPPED | OUTPATIENT
Start: 2023-10-23

## 2023-10-23 ASSESSMENT — FIBROSIS 4 INDEX: FIB4 SCORE: 1.24

## 2023-10-23 NOTE — ASSESSMENT & PLAN NOTE
Chronic stable. Patient was unable to  ozempic due to insurance.     Continue metformin Er 500mg daily.

## 2023-10-23 NOTE — PROGRESS NOTES
"  Chief Complaint   Patient presents with    Follow-Up     BP F/U                                                                                                                                       HPI:   Johan presents today with the following.    Problem   Morbid Obesity With Bmi of 70 and Over, Adult (Hcc)   Prediabetes   Seizure (Hcc)   Chronic Cluster Headache, Not Intractable       Current Outpatient Medications   Medication Sig Dispense Refill    butalbital/apap/caffeine (FIORICET) -40 mg Tab Take 1 Tablet by mouth every four hours as needed for Headache for up to 90 days. 30 Tablet 2    amLODIPine (NORVASC) 10 MG Tab Take 1 Tablet by mouth every day. 90 Tablet 3    hydroCHLOROthiazide (HYDRODIURIL) 50 MG Tab Take 1 Tablet by mouth every day. 90 Tablet 3    carbamazepine (CARBATROL) 200 MG CR capsule Take 2 Capsules by mouth 2 times a day. 360 Capsule 3    cyclobenzaprine (FLEXERIL) 5 mg tablet Take 1-2 Tablets by mouth 3 times a day as needed for Moderate Pain or Muscle Spasms. 90 Tablet 3    metFORMIN ER (GLUCOPHAGE XR) 500 MG TABLET SR 24 HR Take 1 Tablet by mouth every day. 90 Tablet 3    hydrOXYzine HCl (ATARAX) 25 MG Tab Take 1 Tablet by mouth 3 times a day as needed for Anxiety. 90 Tablet 1    coenzyme Q-10 30 MG capsule Take 2 Capsules by mouth every day.      B Complex Vitamins (B COMPLEX 1 PO) Take 1 Tablet by mouth every day.      Multiple Vitamins-Minerals (EMERGEN-C IMMUNE PLUS PO) Take 1 Tablet by mouth every day.      vitamin D3 (CHOLECALCIFEROL) 1000 Unit (25 mcg) Tab Take 2 Tablets by mouth every day.       No current facility-administered medications for this visit.       Allergies as of 10/23/2023    (No Known Allergies)        ROS:  All systems negative expect as addressed in assessment and plan.     /82 (BP Location: Right arm, Patient Position: Sitting)   Pulse 94   Temp 36.2 °C (97.2 °F) (Temporal)   Ht 1.905 m (6' 3\")   Wt (!) 250 kg (552 lb)   SpO2 94%   BMI " 69.00 kg/m²       Physical Exam  Vitals reviewed.   Constitutional:       Appearance: Normal appearance.   HENT:      Head: Normocephalic and atraumatic.      Mouth/Throat:      Mouth: Mucous membranes are moist.   Eyes:      Extraocular Movements: Extraocular movements intact.      Conjunctiva/sclera: Conjunctivae normal.   Pulmonary:      Effort: Pulmonary effort is normal.   Musculoskeletal:         General: Normal range of motion.      Cervical back: Normal range of motion.   Skin:     General: Skin is warm and dry.   Neurological:      General: No focal deficit present.      Mental Status: He is alert and oriented to person, place, and time.   Psychiatric:         Mood and Affect: Mood normal.         Behavior: Behavior normal.         Thought Content: Thought content normal.           Assessment and Plan:  48 y.o. male with the following issues.    1. Encounter for screening for colorectal malignant neoplasm  COLOGUARD (FIT DNA)      2. Need for vaccination  INFLUENZA VACCINE QUAD INJ (PF)      3. Prediabetes        4. Chronic cluster headache, not intractable  Referral to Neurology    butalbital/apap/caffeine (FIORICET) -40 mg Tab      5. Seizure (HCC)  Referral to Neurology      6. Morbid obesity with BMI of 70 and over, adult (HCC)             Prediabetes  Chronic stable. Patient was unable to  ozempic due to insurance.     Continue metformin Er 500mg daily.     Seizure (HCC)  Chronic stable.  Patient is stable on carbamazepine 400 mg twice daily.    Patient reports that his previous neurologist left the practice and so he has not seen a neurologist in about a year.    We will place referral to reestablish with a neurologist.    Morbid obesity with BMI of 70 and over, adult (HCC)  Chronic unchanged.  Patient was unable to fill semaglutide as his insurance did not cover it since he is only prediabetic.    Discussed different options including medications to help with weight loss.    Discussed  the medication topiramate as this may help with appetite suppression as well as manage his migraines and potentially replace his seizure medication.        Chronic cluster headache, not intractable  Chronic stable.    Patient is able to manage as well as with his abortive medication.    Continue Fioricet 1 tablet as needed every 4-6 hours.    Return in about 6 months (around 4/23/2024) for Chronic Health Conditions.      Please note that this dictation was created using voice recognition software. I have worked with consultants from the vendor as well as technical experts from [employer] to optimize the interface. I have made every reasonable attempt to correct obvious errors, but I expect that there are errors of grammar and possibly content that I did not discover before finalizing the note.

## 2023-10-31 NOTE — ASSESSMENT & PLAN NOTE
Chronic unchanged.  Patient was unable to fill semaglutide as his insurance did not cover it since he is only prediabetic.    Discussed different options including medications to help with weight loss.    Discussed the medication topiramate as this may help with appetite suppression as well as manage his migraines and potentially replace his seizure medication.

## 2023-10-31 NOTE — ASSESSMENT & PLAN NOTE
Chronic stable.    Patient is able to manage as well as with his abortive medication.    Continue Fioricet 1 tablet as needed every 4-6 hours.

## 2023-10-31 NOTE — ASSESSMENT & PLAN NOTE
Chronic stable.  Patient is stable on carbamazepine 400 mg twice daily.    Patient reports that his previous neurologist left the practice and so he has not seen a neurologist in about a year.    We will place referral to reestablish with a neurologist.

## 2023-11-16 ENCOUNTER — TELEPHONE (OUTPATIENT)
Dept: NEUROLOGY | Facility: MEDICAL CENTER | Age: 48
End: 2023-11-16

## 2023-11-16 ENCOUNTER — OFFICE VISIT (OUTPATIENT)
Dept: NEUROLOGY | Facility: MEDICAL CENTER | Age: 48
End: 2023-11-16
Attending: NURSE PRACTITIONER
Payer: COMMERCIAL

## 2023-11-16 DIAGNOSIS — G43.019 INTRACTABLE MIGRAINE WITHOUT AURA AND WITHOUT STATUS MIGRAINOSUS: ICD-10-CM

## 2023-11-16 DIAGNOSIS — R56.9 SEIZURE (HCC): ICD-10-CM

## 2023-11-16 DIAGNOSIS — D18.02 HEMANGIOMA OF INTRACRANIAL STRUCTURE (HCC): ICD-10-CM

## 2023-11-16 PROCEDURE — 99212 OFFICE O/P EST SF 10 MIN: CPT | Performed by: NURSE PRACTITIONER

## 2023-11-16 PROCEDURE — 99214 OFFICE O/P EST MOD 30 MIN: CPT | Performed by: NURSE PRACTITIONER

## 2023-11-16 NOTE — TELEPHONE ENCOUNTER
Received [employer] request via [employer]  for Nurtec 75mg tab. (Quantity:8 tab, Day Supply:30)     Insurance: [id number]  Member ID:  [id number]  BIN: [id number]  PCN: n/a  Group: [id number]     Ran Test claim via [place] & medication Rejects stating prior authorization is required.

## 2023-11-16 NOTE — PROGRESS NOTES
Neurology Follow-up  Last appointment was with Dr Hdez, 2022    Patient: Johan Wellington  : 1975    Referring Physician:Alpa Morgan A.P* Jennifer L Simcox, A.P.RMENDY    CC: seizures and headache      IMPRESSION:    1. Seizure type and frequency of seizures-focal onset seizure   One-time known seizure   2. Etiology/Syndrome- focal structural lesion   3. EEG findings- n/a  4. 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 Antiepileptic drug side effects and counseling done  Current AED: /400  6. Surgery consideration: none  7. Safety issues counseling done     Left frontoparietal hemisphere hemangioma: stable on CTA   Cluster headache: respond to Imitrex       PLAN:    One-time Seizure:  Routine EEG for baseline  Given his risk for seizure, recommend to continue CBZ for now. We will discuss further once EEG is completed.     Continue Vitamin D3 ideally 5000iU daily.    Chronic Episodic Migraine Headaches:  New Rx for Nurtec 75mg ODT preventative dosing, 75mg every other day.  Use OTC advil as need for breakthrough headache.    2. Consider new start of Topiramate.    3. Return for follow-up after EEG to discuss medication management.    4. Counseled at length regarding migraines and migraine management as well as avoidance of triggers.    5. Needs Follow-up with PCP and sleep to address sleep apnea.    6. Obtain Head CT without and CTA of Head for serial imaging of hemangioma.    Return for follow-up after EEG to discuss plan of care.    HISTORY:    I had the opportunity to see .  Johan Wellington in the epilepsy clinic on 2022 for seizure disorder. he came alone.  This is his initial visit. he is referred by JOHN [email or web address].[email or web address] for possible seizure disorder. He was previously seen by Dr. West and Dr Adams.     Dominant hand: right handed   Outside records and ED record reviewed: Yes.    In brief, his pertinent medical history is  remarkable for left hemisphere hemangioma s/p hemorrhage with resultant seizure at age 27 (2002)  One seizure only, nocturnal seizure and he was found to have to ICH due to hemangioma   He was started on PHT with gum issue and switched to CBZ.   He has been on CBZ since then without recurrent seizure.     He has been diagnosed with chronic cluster headache, his mother also has cluster headache. Headaches have evolved however and diagnosis has as well.      -  Headaches are random at onset.  Longest time frame now without having a headache.  Last known migraine was 2-3 weeks ago.  Not seasonal and triggers are unknown.      Typical Headache:  Usually awakens in the morning, sometimes may awaken him in the early hours.  Pain is quite intense lasting for 24 hours with 1-2 days recovery.    Photophobia- mild  Sonophobia- none    Nausea- mild  Vertigo- moderate.    Childhood history of cyclical vomiting.    He was given Imitrex with good resolution for headache, he has 2 per month. Sumatriptan upsets his stomach.    Sleep apnea: diagnosed per sleep study however does not have or use a CPAP.  Multiple risk factors for sleep apnea.      The seizures were isolated. There was    no cluster of seizures,    no history of status epilepticus    Special features:  They were noted only during the sleep,  there was no specific timing.     Potential triggering factors were reviewed   Sleep deprivation   Alcohol   Stress   Other    Epilepsy risk factors: denies family history.    -Positive:  left frontoparietal hemisphere hemangioma s/p bleeding     Current antiepileptic medicines:   mg, 2/2    Previous workup:    1. EEG data: n/a (not performed per request after last appointment)    2. Neuroimaging data: CT head 2007 STABLE LEFT TEMPOROPARIETAL LESION CONSISTENT WITH A HISTORY OF VASCULAR   MALFORMATION.  THERE HAS BEEN NO SIGNIFICANT INTERVAL CHANGE SINCE 2005.       CTA 2008   1.3 CM SPHERICAL LESION CONTAINING SOME  CALCIFICATIONS IN THE LEFT   CEREBRAL HEMISPHERE IS IDENTIFIED AS SEEN ON THE PRIOR OUTSIDE CTs.  NO   LARGE VASCULAR STRUCTURE IS IDENTIFIED WITHIN OR LEADING TO OR AWAY FROM   THIS STRUCTURE THAT WOULD SUGGEST AVM.  DIFFERENTIAL DIAGNOSIS INCLUDES   CAPILLARY TELANGIECTASIA OR CAVERNOUS HEMANGIOMA.  NO SURROUNDING BRAIN   SWELLING OR EDEMA IS IDENTIFIED.        3. Recent AED level: CBZ level 5.0 10/2023      Vit D 37  CBC, CMP normal 11/2021     Prior antiepileptic medications were reviewed  Carbamazepine (Tegretol)  and Phenytoin (Dilantin)    Antiepileptic medications most useful:    Other therapeutic interventions:   Vagus nerve stimulation   Diet   Surgery for epilepsy   Other    Current Outpatient Medications   Medication Sig Dispense Refill    butalbital/apap/caffeine (FIORICET) -40 mg Tab Take 1 Tablet by mouth every four hours as needed for Headache for up to 90 days. 30 Tablet 2    amLODIPine (NORVASC) 10 MG Tab Take 1 Tablet by mouth every day. 90 Tablet 3    hydroCHLOROthiazide (HYDRODIURIL) 50 MG Tab Take 1 Tablet by mouth every day. 90 Tablet 3    carbamazepine (CARBATROL) 200 MG CR capsule Take 2 Capsules by mouth 2 times a day. 360 Capsule 3    cyclobenzaprine (FLEXERIL) 5 mg tablet Take 1-2 Tablets by mouth 3 times a day as needed for Moderate Pain or Muscle Spasms. 90 Tablet 3    metFORMIN ER (GLUCOPHAGE XR) 500 MG TABLET SR 24 HR Take 1 Tablet by mouth every day. 90 Tablet 3    hydrOXYzine HCl (ATARAX) 25 MG Tab Take 1 Tablet by mouth 3 times a day as needed for Anxiety. 90 Tablet 1    coenzyme Q-10 30 MG capsule Take 2 Capsules by mouth every day.      B Complex Vitamins (B COMPLEX 1 PO) Take 1 Tablet by mouth every day.      Multiple Vitamins-Minerals (EMERGEN-C IMMUNE PLUS PO) Take 1 Tablet by mouth every day.      vitamin D3 (CHOLECALCIFEROL) 1000 Unit (25 mcg) Tab Take 2 Tablets by mouth every day.       No current facility-administered medications for this visit.        No Known  Allergies    No past medical history on file.    Social History     Socioeconomic History    Marital status:      Spouse name: Not on file    Number of children: Not on file    Years of education: Not on file    Highest education level: Not on file   Occupational History    Not on file   Tobacco Use    Smoking status: Never    Smokeless tobacco: Never   Vaping Use    Vaping Use: Never used   Substance and Sexual Activity    Alcohol use: Not Currently    Drug use: Not Currently    Sexual activity: Not on file   Other Topics Concern    Not on file   Social History Narrative    Not on file     Social Determinants of Health     Financial Resource Strain: Not on file   Food Insecurity: Not on file   Transportation Needs: Not on file   Physical Activity: Not on file   Stress: Not on file   Social Connections: Not on file   Intimate Partner Violence: Not on file   Housing Stability: Not on file       Family History   Problem Relation Age of Onset    Heart Disease Mother     Arthritis Mother     Hypertension Mother     Diabetes Mother     Cancer Father     No Known Problems Sister          PHYSICAL EXAM:  HR 88, RR 18 on RA, 252.1 lbs    On examination,  He appears his stated age. He has a normal, reactive affect. No apparent distress,  alert and appropriate. No labored breathing.  There is no peripheral edema. Skin: no rash or abnormalities    He gives a precise account of  his clinical symptoms. He has fluent conversational speech, without error.    Visual fields are full to confrontation.  Pupils are equal, round, and reactive to light.  Extraocular movements are conjugate, full, and without nystagmus His face is symmetric.     There is normal muscle bulk and tone in all four extremities.  No drift, or satelliting of the upper extremities.  Normal amplitude rapid alternating movements using the digits of either hand.  I see no tremor.     Gait is normal.     Sleep Apnea: confirmed on sleep study.    The total  visit duration was of 45+ minutes of which more than 50% was spent in coordination of care and counseling.     Taryn Gayle MSN, FNP-BC, APRN  Department of Neurology at [facility]  Phone: [phone]  Fax: [phone]  E-mail: [email or web address]

## 2023-11-20 NOTE — TELEPHONE ENCOUNTER
Prior Authorization for Nurtec 75mg tab has been approved for a quantity of 16 tab, day supply 23    Prior Authorization reference number: [id number]  Insurance: [employer]  Effective dates: 10/17/2023 to 11/15/2024  Copay: $0.00     Is patient eligible to fill with [facility]? Yes    Next Steps: The Patients copay is less than $5.00. Will contact the patient to determine choice of pharmacy, if applicable.

## 2024-01-03 ENCOUNTER — APPOINTMENT (OUTPATIENT)
Dept: RADIOLOGY | Facility: MEDICAL CENTER | Age: 49
End: 2024-01-03
Attending: NURSE PRACTITIONER
Payer: COMMERCIAL

## 2024-01-03 ENCOUNTER — NON-PROVIDER VISIT (OUTPATIENT)
Dept: NEUROLOGY | Facility: MEDICAL CENTER | Age: 49
End: 2024-01-03
Attending: NURSE PRACTITIONER
Payer: COMMERCIAL

## 2024-01-03 DIAGNOSIS — D18.02 HEMANGIOMA OF INTRACRANIAL STRUCTURE (HCC): ICD-10-CM

## 2024-01-03 DIAGNOSIS — R56.9 SEIZURE (HCC): ICD-10-CM

## 2024-01-03 PROCEDURE — 95816 EEG AWAKE AND DROWSY: CPT | Performed by: STUDENT IN AN ORGANIZED HEALTH CARE EDUCATION/TRAINING PROGRAM

## 2024-01-03 PROCEDURE — 70496 CT ANGIOGRAPHY HEAD: CPT

## 2024-01-03 PROCEDURE — 700117 HCHG RX CONTRAST REV CODE 255: Performed by: NURSE PRACTITIONER

## 2024-01-03 RX ADMIN — IOHEXOL 100 ML: 350 INJECTION, SOLUTION INTRAVENOUS at 10:55

## 2024-01-03 NOTE — PROCEDURES
OUTPATIENT ROUTINE VIDEO ELECTROENCEPHALOGRAM REPORT    REFERRING PROVIDER: MARTINA Ash  [address] [facility] [place] [address]  DOS: 01/03/2024    STUDY DURATION: 0 hours and 23 minutes of total recording time.     INDICATION:  Johan Wellington 48 y.o. male presenting with seizure(s)    RELEVANT MEDICATIONS/TREATMENTS:   Current Outpatient Medications   Medication Instructions    amLODIPine (NORVASC) 10 mg, Oral, DAILY    B Complex Vitamins (B COMPLEX 1 PO) 1 Tablet, Oral, DAILY    butalbital/apap/caffeine (FIORICET) -40 mg Tab 1 Tablet, Oral, EVERY 4 HOURS PRN    carbamazepine (CARBATROL) 400 mg, Oral, 2 TIMES DAILY    coenzyme Q-10 60 mg, Oral, DAILY    cyclobenzaprine (FLEXERIL) 5-10 mg, Oral, 3 TIMES DAILY PRN    hydroCHLOROthiazide 50 mg, Oral, DAILY    hydrOXYzine HCl (ATARAX) 25 mg, Oral, 3 TIMES DAILY PRN    metFORMIN ER (GLUCOPHAGE XR) 500 mg, Oral, DAILY    Multiple Vitamins-Minerals (EMERGEN-C IMMUNE PLUS PO) 1 Tablet, Oral, DAILY    Rimegepant Sulfate (NURTEC) 75 mg, Oral, 1 TIME DAILY PRN    vitamin D3 (CHOLECALCIFEROL) 2,000 Units, Oral, DAILY        TECHNIQUE:   Routine VEEG was set up by a Neurodiagnostic technologist who performed education to the patient and staff. A minimum of 23 electrodes and 23 channel recording was setup and performed by Neurodiagnostic technologist, in accordance with the international 10-20 system. The study was reviewed in bipolar and referential montages. The recording examined the patient in the  awake and drowsy state(s).     DESCRIPTION OF THE RECORD:  During wakefulness, the background was continuous and showed a 10 Hz posterior dominant rhythm.  There was reactivity to eye closure/opening.  An anterior-posterior gradient was noted with faster beta frequencies seen anteriorly.  During drowsiness, theta/delta frequencies were seen.    EEG Sleep: N2 sleep architecture was not seen.    ICTAL AND INTERICTAL FINDINGS:   No focal or  generalized epileptiform activity noted.     No regional slowing or persistent focal asymmetries were seen.    No seizures.     ACTIVATION PROCEDURES:   Intermittent Photic stimulation was performed in a stepwise fashion from 1 to 30 Hz and did not elicited any abnormalities on EEG.     EKG: Sampling of the EKG recording showed sinus rhythm    EVENTS:  None    INTERPRETATION:   Normal video EEG recording in the awake and drowsy state(s):  - No regional slowing or persistent focal asymmetries were seen.  - No epileptiform discharges were seen.  - No seizures. Clinical correlation is recommended.      Note: A normal EEG does not rule out the possibility of seizures or exclude a diagnosis of epilepsy.  If the clinical suspicion remains high for seizures, a prolonged recording to capture clinical or subclinical events may be helpful.      David De Leon MD  Department of Neurology at [facility]  General Neurologist and Epileptologist  Director of [facility] Level III Comprehensive Epilepsy Program  Professor of Clinical Neurology, [facility].   Phone: [phone]  Fax: [phone]  E-mail: [email or web address]

## 2024-02-13 ENCOUNTER — TELEPHONE (OUTPATIENT)
Dept: NEUROLOGY | Facility: MEDICAL CENTER | Age: 49
End: 2024-02-13

## 2024-02-13 ENCOUNTER — OFFICE VISIT (OUTPATIENT)
Dept: NEUROLOGY | Facility: MEDICAL CENTER | Age: 49
End: 2024-02-13
Attending: STUDENT IN AN ORGANIZED HEALTH CARE EDUCATION/TRAINING PROGRAM
Payer: COMMERCIAL

## 2024-02-13 VITALS
HEIGHT: 75 IN | BODY MASS INDEX: 39.17 KG/M2 | DIASTOLIC BLOOD PRESSURE: 100 MMHG | TEMPERATURE: 97.5 F | SYSTOLIC BLOOD PRESSURE: 162 MMHG | RESPIRATION RATE: 18 BRPM | WEIGHT: 315 LBS | OXYGEN SATURATION: 96 % | HEART RATE: 94 BPM

## 2024-02-13 DIAGNOSIS — Z91.89 AT RISK FOR OBSTRUCTIVE SLEEP APNEA: ICD-10-CM

## 2024-02-13 DIAGNOSIS — D18.02 HEMANGIOMA OF INTRACRANIAL STRUCTURE (HCC): ICD-10-CM

## 2024-02-13 DIAGNOSIS — R56.9 SEIZURE (HCC): ICD-10-CM

## 2024-02-13 DIAGNOSIS — G43.019 INTRACTABLE MIGRAINE WITHOUT AURA AND WITHOUT STATUS MIGRAINOSUS: ICD-10-CM

## 2024-02-13 PROCEDURE — 3080F DIAST BP >= 90 MM HG: CPT | Performed by: STUDENT IN AN ORGANIZED HEALTH CARE EDUCATION/TRAINING PROGRAM

## 2024-02-13 PROCEDURE — 99417 PROLNG OP E/M EACH 15 MIN: CPT | Performed by: STUDENT IN AN ORGANIZED HEALTH CARE EDUCATION/TRAINING PROGRAM

## 2024-02-13 PROCEDURE — 3077F SYST BP >= 140 MM HG: CPT | Performed by: STUDENT IN AN ORGANIZED HEALTH CARE EDUCATION/TRAINING PROGRAM

## 2024-02-13 PROCEDURE — 99215 OFFICE O/P EST HI 40 MIN: CPT | Performed by: STUDENT IN AN ORGANIZED HEALTH CARE EDUCATION/TRAINING PROGRAM

## 2024-02-13 PROCEDURE — 99212 OFFICE O/P EST SF 10 MIN: CPT | Performed by: STUDENT IN AN ORGANIZED HEALTH CARE EDUCATION/TRAINING PROGRAM

## 2024-02-13 RX ORDER — TOPIRAMATE 50 MG/1
50 TABLET, FILM COATED ORAL 2 TIMES DAILY
Qty: 60 TABLET | Refills: 0 | Status: SHIPPED | OUTPATIENT
Start: 2024-02-13 | End: 2024-03-21

## 2024-02-13 RX ORDER — TOPIRAMATE 25 MG/1
TABLET ORAL
Qty: 21 TABLET | Refills: 0 | Status: SHIPPED | OUTPATIENT
Start: 2024-02-13 | End: 2024-02-27

## 2024-02-13 RX ORDER — LANOLIN ALCOHOL/MO/W.PET/CERES
400 CREAM (GRAM) TOPICAL DAILY
Qty: 90 TABLET | Refills: 3 | Status: SHIPPED | OUTPATIENT
Start: 2024-02-13 | End: 2025-02-07

## 2024-02-13 RX ORDER — CARBAMAZEPINE 200 MG/1
400 CAPSULE, EXTENDED RELEASE ORAL 2 TIMES DAILY
Qty: 360 CAPSULE | Refills: 0 | Status: SHIPPED | OUTPATIENT
Start: 2024-02-13 | End: 2024-05-13

## 2024-02-13 RX ORDER — RIBOFLAVIN (VITAMIN B2) 400 MG
400 TABLET ORAL DAILY
Qty: 90 TABLET | Refills: 3 | Status: SHIPPED | OUTPATIENT
Start: 2024-02-13

## 2024-02-13 ASSESSMENT — FIBROSIS 4 INDEX: FIB4 SCORE: 1.24

## 2024-02-13 NOTE — TELEPHONE ENCOUNTER
Received Refill PA request via [facility]  for topiramate (TOPAMAX) 50 MG tablet. (Quantity:60, Day Supply:30)     Insurance: MAYNOR  Member ID:  [id number]  BIN: [id number]  PCN: [id number]  Group: [id number]     Ran Test claim via [place] & medication Pays for a $6.95 copay. Will outreach to patient to offer specialty pharmacy services and or release to preferred pharmacy

## 2024-02-13 NOTE — PATIENT INSTRUCTIONS
Patient being assessed by Dr Albert in T2   Preventive migraine treatment  -Start Topamax 25 mg nightly for 1 week  -Increase Topamax 25 mg twice daily for 1 week  -Increase Topamax to 50 mg twice daily thereafter    -At this dose, the Topamax will not prevent seizures but can prevent migraines. We need to make sure you don't have side effects before increasing the dose further.     -Continue carbamazepine with no changes for seizure prevention    Additional daily migraine preventive medications:  -Riboflavin 400 mg daily (will turn urine a bright yellow color)  -Magnesium oxide 400 mg daily (take with food at night eg. After dinner to avoid upset stomach)    For acute migraine (meaning take at the first sign of a migraine)  -Nurtec 75 mg tablet       Referral to sleep medicine for evaluation of whether you have obstructive sleep apnea, which can make headaches and seizures worse.       From [employer]: Riboflavin, Magnesium, Topamax (2 formulations), Nurtec

## 2024-02-13 NOTE — PROGRESS NOTES
"[facility]ilepsy [facility]  Transfer of Care Visit      Patient name: Johan Wellington  YOB: 1975  MRN: [id number]  Referring provider: MARTINA Guardado  [address]  [place] [id number]   Date of visit: 2/12/2024     CC: headaches      HPI:    Johan Wellington is a 48 y.o. right-handed  man with a history of seizures being seen in follow up. Last visit 11/16/2023 with KRISTA Coker.    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 For example, last week he went to work and nothing was wrong but he felt very down on that day, \"lower mood than normal\". This does not happen often. He feels it is not consistent enough to where it is a problem, but he notices it happening intermittently. No thoughts of suicide recently. In the past he has had migraine that were so severe he wanted to end the pain and had brief thoughts suicide. He reports that it feels like a reaction to the pain and has never had a plan.      Side effects: none reported    Driving: yes    Vitamin D: taking      Risk factors for epileptic seizures:  (+) L frontoparietal hemangioma with resultant hemorrhage  Normal birth history, no complications, born at term.   No history of significant head trauma.   No history of stroke or meningitis.  No family history of epilepsy.   No febrile seizures.       The single seizure happened on a Saturday early morning. He had gone to bed and the next thing he remembers he was in the ambulance. EMT told him he had a seizure and that he was going to the hospital. The event was witnessed by his wife at the time who noted bed shaking.     He " "initially took Dilantin for 2 years but he had gingival hyperplasia. He went to the dentist and had gums scraped. The next day he had severe gum swelling to the point he was not able to see his teeth. At that point he started Carbamazepine and weaned off of Dilantin.     Dr. Adams was his first neurologist, then Dr. West, then saw Dr. Hdez once. Most recent visit with KRISTA Coker.     At previous visit with Dr. Hdez she had brought up Topamax for migraine prevention and potential transition off of carbamazepine to Topamax. He had first migraine at age 8. He has occasional tension headaches as well. He went 2 months last year with no headaches, other times he has 3 in one week. He states they are very debilitated. He has photophobia and phonophobia. As a child he would vomit and \"pass out\" with the migraines. When he got older the nausea subsided but the dysequilibrium was severe.       Migraines are his biggest concern currently.     His mom had samples called migrainex which very effective at aborting the headaches in the past but he has not used it in a long time.     He cannot take NSAIDs, Excedrin because of his history of hemangioma.     He has tried sumatriptan however experienced gastric pain. He then tried Fioricet which made headaches somewhat better.     BP was high on today's reading. He reports that most of the time it is normal. He is taking HCTZ. He has not taken this medication in the last couple of days. Typically BP is 130/90s.    STOP-BANG score 5.    Medications reviewed, takes B12 3 times weekly. He also takes vitamin D 5000 IU daily, Coq10 daily, men's multivitamin every day.     He forgot to  Nurtec and needs a new RX.         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 He is a . He has been on [facility] since July 2022. He is currently working and job is protected under [facility] if he has an acute migraine.       Past Medical History: "   Patient Active Problem List   Diagnosis    Seizure (HCC)    Hemangioma of intracranial structure (HCC)    Primary hypertension    Chronic cluster headache, not intractable    Chronic bilateral low back pain without sciatica    Morbid obesity with BMI of 70 and over, adult (HCC)    Prediabetes           Past Surgical History: History reviewed. No pertinent surgical history.    Social History:   Social History     Socioeconomic History    Marital status: Other     Spouse name: Not on file    Number of children: Not on file    Years of education: Not on file    Highest education level: Not on file   Occupational History    Not on file   Tobacco Use    Smoking status: Never    Smokeless tobacco: Never   Vaping Use    Vaping Use: Never used   Substance and Sexual Activity    Alcohol use: Not Currently    Drug use: Not Currently    Sexual activity: Not on file   Other Topics Concern    Not on file   Social History Narrative    Not on file     Social Determinants of Health     Financial Resource Strain: Not on file   Food Insecurity: Not on file   Transportation Needs: Not on file   Physical Activity: Not on file   Stress: Not on file   Social Connections: Not on file   Intimate Partner Violence: Not on file   Housing Stability: Not on file       Family Hx:   Family History   Problem Relation Age of Onset    Heart Disease Mother     Arthritis Mother     Hypertension Mother     Diabetes Mother     Cancer Father     No Known Problems Sister        Current Medications:   Current Outpatient Medications:     Rimegepant Sulfate (NURTEC) 75 MG TABLET DISPERSIBLE, Take 1 Tablet by mouth 1 time a day as needed (Take one at onset of Headache. May repeat in 24 hours if unrelieved.)., Disp: 8 Tablet, Rfl: 5    amLODIPine (NORVASC) 10 MG Tab, Take 1 Tablet by mouth every day., Disp: 90 Tablet, Rfl: 3    hydroCHLOROthiazide (HYDRODIURIL) 50 MG Tab, Take 1 Tablet by mouth every day., Disp: 90 Tablet, Rfl: 3    carbamazepine (CARBATROL)  200 MG CR capsule, Take 2 Capsules by mouth 2 times a day., Disp: 360 Capsule, Rfl: 3    cyclobenzaprine (FLEXERIL) 5 mg tablet, Take 1-2 Tablets by mouth 3 times a day as needed for Moderate Pain or Muscle Spasms., Disp: 90 Tablet, Rfl: 3    metFORMIN ER (GLUCOPHAGE XR) 500 MG TABLET SR 24 HR, Take 1 Tablet by mouth every day., Disp: 90 Tablet, Rfl: 3    hydrOXYzine HCl (ATARAX) 25 MG Tab, Take 1 Tablet by mouth 3 times a day as needed for Anxiety., Disp: 90 Tablet, Rfl: 1    coenzyme Q-10 30 MG capsule, Take 2 Capsules by mouth every day., Disp: , Rfl:     B Complex Vitamins (B COMPLEX 1 PO), Take 1 Tablet by mouth every day., Disp: , Rfl:     Multiple Vitamins-Minerals (EMERGEN-C IMMUNE PLUS PO), Take 1 Tablet by mouth every day., Disp: , Rfl:     vitamin D3 (CHOLECALCIFEROL) 1000 Unit (25 mcg) Tab, Take 2 Tablets by mouth every day., Disp: , Rfl:     Allergies: No Known Allergies      Physical Exam:   Ambulatory Vitals  Vitals:    02/13/24 0715   BP: (!) 162/100   Pulse: 94   Resp: 18   Temp: 36.4 °C (97.5 °F)   SpO2: 96%       Constitutional: Well-developed, well-nourished, good hygiene. Appears stated age.  Respiratory: normal respiratory effort  Skin: Warm, dry, intact. No rashes observed.    Neurologic:   Mental Status: Awake, alert, oriented x 3.   Speech: Fluent with normal prosody.   Memory: Able to recall recent and remote events accurately.    Concentration: Attentive. Able to focus on history and follow multi-step commands.   Fund of Knowledge: Appropriate.   Cranial Nerves:    CN II: PERRL     CN III, IV, VI: EOMI without nystagmus    CN V: Facial sensation intact and symmetric in all 3 trigeminal distributions    CN VII: No facial asymmetry    CN VIII: Hearing intact to voice    CN IX and X: Palate elevates symmetrically, gag reflex not tested    CN XI: Symmetric shoulder shrug     CN XII: Tongue midline   Motor: 5/5 in upper and lower extremities bilaterally   Sensory: Intact light touch, vibration  and temperature diffusely    Coordination: No evidence of past-pointing on finger to nose testing, no dysdiadochokinesia.    Gait: ambulates steadily without assistive device   Movements: No resting tremors or abnormal movements observed.   Musculoskeletal:    Strength: as above   Tone: Normal bulk and tone   Joints: No swelling    Studies:      Labs reviewed      Imaging:   CTA head wwo 1/3/2024  IMPRESSION:     Grossly stable sized left frontotemporal enhancing lesion with increased intralesional calcification which could be consistent with the patient's known hemangioma. CT angiogram of the [place] otherwise within normal limits.    EEG Results:     Routine EEG 1/3/2024  INTERPRETATION:   Normal video EEG recording in the awake and drowsy state(s):  - No regional slowing or persistent focal asymmetries were seen.  - No epileptiform discharges were seen.  - No seizures. Clinical correlation is recommended.        Note: A normal EEG does not rule out the possibility of seizures or exclude a diagnosis of epilepsy.  If the clinical suspicion remains high for seizures, a prolonged recording to capture clinical or subclinical events may be helpful.      Assessment/Plan:   Johan Wellington is a 48 y.o. man with a history of localization related seizure x 1 who is here for follow up.     Localization-related epilepsy  He had a recent CTA showing a left frontotemporal enhancing lesion with increased intralesional calcification, most likely related to the known hemangioma which serves as a nidus for seizures. Reassuringly, he has been seizure-free on medication. He had a recent routine EEG which was normal, however it did not capture sleep which may decrease the yield of detecting abnormalities. With the abnormal imaging/hemangioma he is at risk for seizures if he were to stop medication.     He takes vitamin D supplementation.     No contraindication to driving given seizure freedom.     May cross titrate  CBZ to TPM if he is able to tolerate it. This was proposed in the past for better migraine treatment.     Episodic migraine  This is the most bothersome issue currently. He is aware that the starting doses of Topamax will not prevent seizures and he needs to continue carbamazepine with no dosing changes for now. Plan as below in patient instructions.     At risk for sleep apnea  -Referral to sleep medicine      Orders Placed This Encounter    Referral to Pulmonary and Sleep Medicine    Rimegepant Sulfate (NURTEC) 75 MG TABLET DISPERSIBLE    magnesium oxide 400 (240 Mg) MG Tab    Riboflavin 400 MG Tab    topiramate (TOPAMAX) 25 MG Tab    topiramate (TOPAMAX) 50 MG tablet    carbamazepine (CARBATROL) 200 MG CR capsule           Patient Instructions:    Preventive migraine treatment  -Start Topamax 25 mg nightly for 1 week  -Increase Topamax 25 mg twice daily for 1 week  -Increase Topamax to 50 mg twice daily thereafter    -At this dose, the Topamax will not prevent seizures but can prevent migraines. We need to make sure you don't have side effects before increasing the dose further.     -Continue carbamazepine with no changes for seizure prevention    Additional daily migraine preventive medications:  -Riboflavin 400 mg daily (will turn urine a bright yellow color)  -Magnesium oxide 400 mg daily (take with food at night eg. After dinner to avoid upset stomach)    For acute migraine (meaning take at the first sign of a migraine)  -Nurtec 75 mg tablet       Referral to sleep medicine for evaluation of whether you have obstructive sleep apnea, which can make headaches and seizures worse.       Follow up in approximately 8 weeks.     Danita Teresa M.D.   Diplomate, Neurology with Special Qualification in Epilepsy, American Board of Psychiatry and Neurology   of Clinical Neurology, [facility] of Medicine  Level III Epilepsy Center, Department of Neurology at [facility]  [facility] [place]   2/12/2024      During today's encounter we discussed available treatment options and their individual side effect profiles. Total encounter time caring for patient today 70 minutes.

## 2024-02-13 NOTE — TELEPHONE ENCOUNTER
Received Refill PA request via [facility]  for NURTEC 75 MG TABLET DISPERSIBLE. (Quantity:8, Day Supply:30)     Insurance: MAYNOR  Member ID:  [id number]  BIN: [id number]  PCN: [id number]  Group: [id number]     Ran Test claim via [place] & medication Pays for a $0.00 copay. Will outreach to patient to offer specialty pharmacy services and or release to preferred pharmacy. PA ON FILE EXPIRES 11/15/2024// MAX QTY SUPPLY   DAY PERIOD

## 2024-02-13 NOTE — TELEPHONE ENCOUNTER
Received Refill PA request via MSOT  for topiramate (TOPAMAX) 25 MG Tablet. (Quantity:21, Day Supply:14)     Insurance: grant  Member ID:  [id number]  BIN: [id number]  PCN: [id number]  Group: [id number]     Ran Test claim via [place] & medication Pays for a $1.58 copay. Will outreach to patient to offer specialty pharmacy services and or release to preferred pharmacy

## 2024-03-08 ENCOUNTER — TELEPHONE (OUTPATIENT)
Dept: HEALTH INFORMATION MANAGEMENT | Facility: OTHER | Age: 49
End: 2024-03-08
Payer: COMMERCIAL

## 2024-03-20 DIAGNOSIS — R56.9 SEIZURE (HCC): ICD-10-CM

## 2024-03-20 DIAGNOSIS — G43.019 INTRACTABLE MIGRAINE WITHOUT AURA AND WITHOUT STATUS MIGRAINOSUS: ICD-10-CM

## 2024-03-20 NOTE — TELEPHONE ENCOUNTER
Received request via: Pharmacy    Medication Name/Dosage Topiramate 50MG    When was medication last prescribed 02/13/24    How many refills were previously provided 0    How many Refills does he patient have left from last prescription 0    Was the patient seen in the last year in this department? Yes   Date of last office visit 02/13/24     Per last Neurology Office Visit, when was the date of next follow up visit set for?                            Date of office visit follow up request 8 weeks     Does the patient have an upcoming appointment? Yes   If yes, when 04/26/24             If no, schedule appointment N/A    Does the patient have FPC Plus and need 100 day supply (blood pressure, diabetes and cholesterol meds only)? Medication is not for cholesterol, blood pressure or diabetes    PATIENT IS CURRENTLY TAKING 50MG TWICE DAILY

## 2024-03-21 ENCOUNTER — TELEPHONE (OUTPATIENT)
Dept: NEUROLOGY | Facility: MEDICAL CENTER | Age: 49
End: 2024-03-21
Payer: COMMERCIAL

## 2024-03-21 RX ORDER — TOPIRAMATE 50 MG/1
50 TABLET, FILM COATED ORAL 2 TIMES DAILY
Qty: 80 TABLET | Refills: 0 | Status: SHIPPED | OUTPATIENT
Start: 2024-03-21 | End: 2024-04-30

## 2024-03-21 NOTE — TELEPHONE ENCOUNTER
Received Refill PA request via MSOT  for topiramate (TOPAMAX) 50 MG tablet . (Quantity:60, Day Supply:30)     Insurance: MAYNOR  Member ID:  [id number]  BIN: [id number]  PCN: [id number]  Group: [id number]     Ran Test claim via [place] & medication Pays for a $6.95 copay. Will outreach to patient to offer specialty pharmacy services and or release to preferred pharmacy. MAX 30 DAY SUPPLY

## 2024-04-20 ENCOUNTER — APPOINTMENT (OUTPATIENT)
Dept: LAB | Facility: MEDICAL CENTER | Age: 49
End: 2024-04-20
Payer: COMMERCIAL

## 2024-04-23 ENCOUNTER — TELEPHONE (OUTPATIENT)
Dept: MEDICAL GROUP | Facility: PHYSICIAN GROUP | Age: 49
End: 2024-04-23

## 2024-04-23 ENCOUNTER — TELEPHONE (OUTPATIENT)
Dept: NEUROLOGY | Facility: MEDICAL CENTER | Age: 49
End: 2024-04-23

## 2024-04-23 ENCOUNTER — APPOINTMENT (OUTPATIENT)
Dept: MEDICAL GROUP | Facility: PHYSICIAN GROUP | Age: 49
End: 2024-04-23
Payer: COMMERCIAL

## 2024-04-23 NOTE — TELEPHONE ENCOUNTER
NEUROLOGY PATIENT PRE-VISIT PLANNING     Patient was NOT contacted to complete PVP.    Patient Appointment is scheduled as: Established Patient     Is visit type and length scheduled correctly? Yes    [employer] Patient is checked in Patient Demographics? Yes    3.   Is referral attached to visit? Yes    4. Were records received from referring provider? Yes    4. Patient was NOT contacted to have someone accompany them to visit.     5. Is this appointment scheduled as a Hospital Follow-Up?  No    6. Does the patient require any pre procedure or post procedure follow up? No    7. If any orders were placed at last visit or intended to be done for this visit do we have Results/Consult Notes? Yes  Labs - Labs were not ordered at last office visit.  Imaging - Imaging was not ordered at last office visit.  Referrals - A referral was placed to pulmonary and sleep medicine    8. If patient appointment is for Botox - is order pended for provider? N/A    9. Was Plan Assessment from last Neurology Office Visit Reviewed?  Yes

## 2024-04-23 NOTE — TELEPHONE ENCOUNTER
Johan came into clinic on 4/23/2024 he had an appointment today originally but he did not get the call/voicemail that his appointment was canceled.  I was able to get him re-scheduled for 5/1/2024.  He is asking if you could order some labs for him to do before he comes in?  That way he can discuss them with you on the 1st.  Thanks

## 2024-04-26 ENCOUNTER — OFFICE VISIT (OUTPATIENT)
Dept: NEUROLOGY | Facility: MEDICAL CENTER | Age: 49
End: 2024-04-26
Attending: STUDENT IN AN ORGANIZED HEALTH CARE EDUCATION/TRAINING PROGRAM
Payer: COMMERCIAL

## 2024-04-26 VITALS
HEART RATE: 95 BPM | BODY MASS INDEX: 39.17 KG/M2 | OXYGEN SATURATION: 100 % | WEIGHT: 315 LBS | HEIGHT: 75 IN | SYSTOLIC BLOOD PRESSURE: 142 MMHG | DIASTOLIC BLOOD PRESSURE: 90 MMHG | TEMPERATURE: 95.9 F

## 2024-04-26 DIAGNOSIS — R03.0 ELEVATED BLOOD PRESSURE READING: ICD-10-CM

## 2024-04-26 DIAGNOSIS — G43.019 INTRACTABLE MIGRAINE WITHOUT AURA AND WITHOUT STATUS MIGRAINOSUS: ICD-10-CM

## 2024-04-26 DIAGNOSIS — Z91.89 AT RISK FOR OBSTRUCTIVE SLEEP APNEA: ICD-10-CM

## 2024-04-26 DIAGNOSIS — D18.02 HEMANGIOMA OF INTRACRANIAL STRUCTURE (HCC): ICD-10-CM

## 2024-04-26 DIAGNOSIS — R56.9 SEIZURE (HCC): ICD-10-CM

## 2024-04-26 DIAGNOSIS — G40.109 LOCALIZATION-RELATED EPILEPSY (HCC): Primary | ICD-10-CM

## 2024-04-26 PROCEDURE — 99212 OFFICE O/P EST SF 10 MIN: CPT | Performed by: STUDENT IN AN ORGANIZED HEALTH CARE EDUCATION/TRAINING PROGRAM

## 2024-04-26 RX ORDER — TOPIRAMATE 50 MG/1
50 TABLET, FILM COATED ORAL 2 TIMES DAILY
Qty: 180 TABLET | Refills: 1 | Status: SHIPPED | OUTPATIENT
Start: 2024-04-26 | End: 2024-10-23

## 2024-04-26 ASSESSMENT — FIBROSIS 4 INDEX: FIB4 SCORE: 1.24

## 2024-04-26 ASSESSMENT — PATIENT HEALTH QUESTIONNAIRE - PHQ9: CLINICAL INTERPRETATION OF PHQ2 SCORE: 0

## 2024-04-26 NOTE — PROGRESS NOTES
"[facility]  Follow up visit    Patient name: Johan Wellington  YOB: 1975  MRN: [id number]  Date of visit: 4/26/2024     Background:    Johan Wellington is a 48 y.o. right-handed  man with a history of seizures being seen in follow up.     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 For example, last week he went to work and nothing was wrong but he felt very down on that day, \"lower mood than normal\". This does not happen often. He feels it is not consistent enough to where it is a problem, but he notices it happening intermittently. No thoughts of suicide recently. In the past he has had migraine that were so severe he wanted to end the pain and had brief thoughts suicide. He reports that it feels like a reaction to the pain and has never had a plan.       Side effects: none reported     Driving: yes     Vitamin D: taking     Risk factors for epileptic seizures:  (+) L frontoparietal hemangioma with resultant hemorrhage  Normal birth history, no complications, born at term.   No history of significant head trauma.   No history of stroke or meningitis.  No family history of epilepsy.   No febrile seizures.     Interval history:  Patient unaccompanied to visit.     Last seizure August 2002.    Main issue has been migraines. Started Topamax at last visit. No migraines for 2 months, longest interval that he can remember being migraine-free. He had one migraine recently and took a Nurtec which helped. He has usually woken up with a headache however with the last couple of headaches they happened towards the end of the day. "     Initially was very tired with Topamax but he has since adjusted.     He has been trying to think about what to do about increasing the Topamax dose and the implications of anti-seizure protection with carbatrol.    Current Medications:   Current Outpatient Medications:     topiramate (TOPAMAX) 50 MG tablet, Take 1 Tablet by mouth 2 times a day for 40 days., Disp: 80 Tablet, Rfl: 0    Rimegepant Sulfate (NURTEC) 75 MG TABLET DISPERSIBLE, Take 1 Tablet by mouth 1 time a day as needed (Take one at onset of Headache. May repeat in 24 hours if unrelieved.)., Disp: 8 Tablet, Rfl: 5    magnesium oxide 400 (240 Mg) MG Tab, Take 1 Tablet by mouth every day for 360 days., Disp: 90 Tablet, Rfl: 3    Riboflavin 400 MG Tab, Take 400 mg by mouth every day., Disp: 90 Tablet, Rfl: 3    carbamazepine (CARBATROL) 200 MG CR capsule, Take 2 Capsules by mouth 2 times a day for 90 days., Disp: 360 Capsule, Rfl: 0    amLODIPine (NORVASC) 10 MG Tab, Take 1 Tablet by mouth every day., Disp: 90 Tablet, Rfl: 3    hydroCHLOROthiazide (HYDRODIURIL) 50 MG Tab, Take 1 Tablet by mouth every day., Disp: 90 Tablet, Rfl: 3    cyclobenzaprine (FLEXERIL) 5 mg tablet, Take 1-2 Tablets by mouth 3 times a day as needed for Moderate Pain or Muscle Spasms., Disp: 90 Tablet, Rfl: 3    metFORMIN ER (GLUCOPHAGE XR) 500 MG TABLET SR 24 HR, Take 1 Tablet by mouth every day., Disp: 90 Tablet, Rfl: 3    hydrOXYzine HCl (ATARAX) 25 MG Tab, Take 1 Tablet by mouth 3 times a day as needed for Anxiety., Disp: 90 Tablet, Rfl: 1    coenzyme Q-10 30 MG capsule, Take 2 Capsules by mouth every day., Disp: , Rfl:     B Complex Vitamins (B COMPLEX 1 PO), Take 1 Tablet by mouth every day., Disp: , Rfl:     Multiple Vitamins-Minerals (EMERGEN-C IMMUNE PLUS PO), Take 1 Tablet by mouth every day., Disp: , Rfl:     vitamin D3 (CHOLECALCIFEROL) 1000 Unit (25 mcg) Tab, Take 2 Tablets by mouth every day., Disp: , Rfl:     Allergies: No Known Allergies      Physical Exam:    Ambulatory Vitals  Vitals:    04/26/24 1548   BP: (!) 142/90   Pulse: 95   Temp: (!) 35.5 °C (95.9 °F)   SpO2: 100%       Constitutional: Well-developed, well-nourished, good hygiene. Appears stated age.  Respiratory: normal respiratory effort  Skin: Warm, dry, intact. No rashes observed.  Neurologic:   Mental Status: Awake, alert, oriented x 4.   Speech: Fluent with normal prosody.   Memory: Able to recall recent and remote events accurately.    Concentration: Attentive. Able to focus on history and follow multi-step commands.   Fund of Knowledge: Appropriate.   Cranial Nerves:    CN II: PERRL    CN III, IV, VI: EOMI without nystagmus    CN V: Facial sensation intact and symmetric in all 3 trigeminal distributions    CN VII: No facial asymmetry    CN VIII: Hearing intact to voice    CN IX and X: Palate elevates symmetrically, gag reflex not tested    CN XI: Symmetric shoulder shrug     CN XII: Tongue midline   Motor: moving all 4 extremities fully and equally   Sensory: Intact and equal to light touch diffusely    Gait: ambulates steadily without assistive device. Romberg negative. Able to perform tandem gait.    Movements: No resting tremors or abnormal movements observed.     Studies:      Labs reviewed      Imaging:   CTA head wwo 1/3/2024  IMPRESSION:     Grossly stable sized left frontotemporal enhancing lesion with increased intralesional calcification which could be consistent with the patient's known hemangioma. CT angiogram of the [place] of [place] otherwise within normal limits.     EEG Results:      Routine EEG 1/3/2024  INTERPRETATION:   Normal video EEG recording in the awake and drowsy state(s):  - No regional slowing or persistent focal asymmetries were seen.  - No epileptiform discharges were seen.  - No seizures. Clinical correlation is recommended.        Note: A normal EEG does not rule out the possibility of seizures or exclude a diagnosis of epilepsy.  If the clinical suspicion remains high  for seizures, a prolonged recording to capture clinical or subclinical events may be helpful.      Assessment/Plan:   Johan Wellington is a 48 y.o. man with a history of localization related seizure x 1 who is here for follow up.      Localization-related epilepsy  CTA 1/2024 with left frontotemporal enhancing lesion with increased intralesional calcification, most likely related to the known hemangioma which serves as a nidus for seizures. Reassuringly, he has been seizure-free on medication. He had a recent routine EEG which was normal, however it did not capture sleep which may decrease the yield of detecting abnormalities. With the abnormal imaging/hemangioma he is at risk for seizures if he were to stop medication.      He takes vitamin D supplementation.      No contraindication to driving given seizure freedom.      We may be able to cross titrate CBZ to TPM if he is able to tolerate the Topamax at anti-seizure therapeutic dose. This was proposed in the past for better migraine treatment.      Episodic migraine  Started Topamax in 2/2023. Discussed at length about proper dosing of Carbatrol and Topamax to ensure seizure protection. Essentially, he must stay on Carbatrol unless he is able to tolerate a therapeutic dose of Topamax for anti-seizure purposes. He is aware that at a dose of 50 mg BID Topamax will not prevent seizures and he needs to continue carbamazepine with no dosing changes for now. He will consider whether he wants to increase Topamax dose further and send a message via [employer].      At risk for sleep apnea  -Referral to sleep medicine - scheduled in August 2024    Elevated blood pressure reading  BP in office today 142/90. Patient counseled to take blood pressures at home and bring recordings to next PCP visit for optimal blood pressure medication titration.       Follow up in approximately 3 months.       Danita Teresa M.D.   Diplomate, Neurology with Special Qualification in  Epilepsy, American Board of Psychiatry and Neurology   of Clinical Neurology, [facility]  Level III Epilepsy Center, Department of Neurology at [facility]       During today's encounter we discussed available treatment options and their individual side effect profiles. Total encounter time caring for patient today 47 minutes.

## 2024-05-01 ENCOUNTER — APPOINTMENT (OUTPATIENT)
Dept: MEDICAL GROUP | Facility: PHYSICIAN GROUP | Age: 49
End: 2024-05-01
Payer: COMMERCIAL

## 2024-05-01 VITALS
RESPIRATION RATE: 18 BRPM | WEIGHT: 315 LBS | OXYGEN SATURATION: 99 % | HEART RATE: 93 BPM | BODY MASS INDEX: 39.17 KG/M2 | DIASTOLIC BLOOD PRESSURE: 70 MMHG | SYSTOLIC BLOOD PRESSURE: 124 MMHG | HEIGHT: 75 IN | TEMPERATURE: 97.6 F

## 2024-05-01 DIAGNOSIS — R56.9 SEIZURE (HCC): ICD-10-CM

## 2024-05-01 DIAGNOSIS — G44.029 CHRONIC CLUSTER HEADACHE, NOT INTRACTABLE: ICD-10-CM

## 2024-05-01 DIAGNOSIS — R73.03 PREDIABETES: ICD-10-CM

## 2024-05-01 DIAGNOSIS — E55.9 VITAMIN D DEFICIENCY: ICD-10-CM

## 2024-05-01 DIAGNOSIS — I10 PRIMARY HYPERTENSION: ICD-10-CM

## 2024-05-01 DIAGNOSIS — Z12.11 ENCOUNTER FOR SCREENING FOR COLORECTAL MALIGNANT NEOPLASM: ICD-10-CM

## 2024-05-01 DIAGNOSIS — Z12.12 ENCOUNTER FOR SCREENING FOR COLORECTAL MALIGNANT NEOPLASM: ICD-10-CM

## 2024-05-01 DIAGNOSIS — Z13.29 SCREENING FOR THYROID DISORDER: ICD-10-CM

## 2024-05-01 DIAGNOSIS — E78.5 DYSLIPIDEMIA: ICD-10-CM

## 2024-05-01 PROCEDURE — 99214 OFFICE O/P EST MOD 30 MIN: CPT | Performed by: NURSE PRACTITIONER

## 2024-05-01 PROCEDURE — 3074F SYST BP LT 130 MM HG: CPT | Performed by: NURSE PRACTITIONER

## 2024-05-01 PROCEDURE — 3078F DIAST BP <80 MM HG: CPT | Performed by: NURSE PRACTITIONER

## 2024-05-01 ASSESSMENT — FIBROSIS 4 INDEX: FIB4 SCORE: 1.24

## 2024-05-01 NOTE — PROGRESS NOTES
Chief Complaint   Patient presents with    Follow-Up     Chronic Health Conditions                                                                                                                                       HPI:   Johan presents today with the following.    The patient attended the consultation today to provide an update on his health status, medication management, and recent lifestyle changes.    The patient reports having been prescribed Topamax for the past three months, with an initial side effect of tiredness, but has since adjusted. The current dosage is 100 milligrams daily, split into two doses of 50 milligrams each. This medication has effectively prevented migraines for approximately two months, with a recent migraine mitigated by Nurtec, reducing it to a tension headache. The patient noted a change in the timing of migraine onset, now occurring at bedtime rather than upon waking.    The patient has been on carbamazepine for over two decades and continues this medication. There was consideration of transitioning off carbamazepine depending on his response to Topamax, but no changes have been made yet.    The patient is also eligible for colon cancer screening due to updated guidelines that lowered the recommended screening age from 50 to 45. He has no family history of colon cancer, making him low risk. An at-home stool test is planned, with a follow-up every three years if results are negative. A colonoscopy will be pursued if any stool test returns positive.    The patient reported experiencing weight loss as a side effect of Topamax. He participated in a 5K run on Saturday and mentioned still feeling sore from the event. The patient also discussed participating in a state-sponsored weight loss program called [employer], which he found beneficial.    The patient's blood pressure was noted to be good, with a reading of 70, which is lower than his usual range of 80 to 90. He reported a  "significant reduction in migraine frequency since starting medication, experiencing only two or three migraines compared to almost daily occurrences before treatment.    Since February, the patient has lost 25 pounds. Labs were ordered for July or August as part of an annual screening to monitor blood sugar, cholesterol, and other parameters. He was instructed to fast for 8 to 10 hours before the lab work, consuming water only. The lab location is open from 7 a.m. to noon on Saturdays.    The patient's current health management strategies and concerns have been discussed, with particular attention to his medication management, recent lifestyle changes, and upcoming screening procedures.    ROS:  All systems negative expect as addressed in assessment and plan.     /70 (BP Location: Left arm, Patient Position: Sitting)   Pulse 93   Temp 36.4 °C (97.6 °F) (Temporal)   Resp 18   Ht 1.905 m (6' 3\")   Wt (!) 245 kg (541 lb)   SpO2 99%   BMI 67.62 kg/m²     Objective:    Physical Exam  Vitals reviewed.   Constitutional:       Appearance: Normal appearance. He is obese.   HENT:      Head: Normocephalic and atraumatic.      Mouth/Throat:      Mouth: Mucous membranes are moist.   Eyes:      Extraocular Movements: Extraocular movements intact.      Conjunctiva/sclera: Conjunctivae normal.   Pulmonary:      Effort: Pulmonary effort is normal.   Musculoskeletal:         General: Normal range of motion.      Cervical back: Normal range of motion.   Skin:     General: Skin is warm and dry.   Neurological:      General: No focal deficit present.      Mental Status: He is alert and oriented to person, place, and time.   Psychiatric:         Mood and Affect: Mood normal.         Behavior: Behavior normal.         Thought Content: Thought content normal.       Diagnostic Test Results:  - EEG: Challenging due to discomfort staying still, affecting results.  - CAT scans: One with contrast and one without, no new changes " compared to last results from 2008.  - A1c: Last recorded value was 6, indicating prediabetes (5.6 or less is normal, 6.5 or higher is diabetic).    Physical Examination:  - Weight loss reported as a side effect of Topamax medication.  - Soreness reported from participation in a 5K run.    Assessment and Plan:  48 y.o. male with the following issues.    1. Encounter for screening for colorectal malignant neoplasm  - COLOGUARD (FIT DNA)    2. Seizure (HCC)  - Comp Metabolic Panel; Future  - URINALYSIS; Future    3. Prediabetes  - Comp Metabolic Panel; Future  - HEMOGLOBIN A1C; Future  - URINALYSIS; Future    4. Chronic cluster headache, not intractable    5. Primary hypertension  - Comp Metabolic Panel; Future  - URINALYSIS; Future    6. Dyslipidemia  - Lipid Profile; Future    7. Screening for thyroid disorder  - FREE THYROXINE; Future  - TSH; Future    8. Vitamin D deficiency  - VITAMIN D,25 HYDROXY (DEFICIENCY); Future      1. Migraine Management:     - Assessment: Patient has been on Topamax 100 mg daily (50 mg BID) for the past three months, effectively preventing migraines for approximately two months. Recent migraine was mitigated by Nurtec, reducing it to a tension headache. He is currently taking 400 mg of magnesium citrate as part of his migraine cocktail.     - Plan:          a. Continue Topamax at the current dosage.          b. If migraines become more frequent, consider increasing the Topamax dosage to 150 mg daily (75 mg BID).          c. Encourage him to stay well hydrated, add lemon to his water, and continue taking magnesium citrate.    2. Seizure Disorder:     - Assessment: Patient has been on carbamazepine for over two decades and continues this medication alongside cholesterol management. Dr. Danita Teresa is managing his seizure disorder and migraines.     - Plan:          a. Continue carbamazepine at the current dosage.          b. Monitor his response to Topamax and consider transitioning  off carbamazepine if necessary.    3. Colon Cancer Screening:     - Assessment: Patient is eligible for colon cancer screening due to updated guidelines that lowered the recommended screening age from 50 to 45.     - Plan:          a. Perform an at-home stool test, with a follow-up every three years if results are negative.          b. Pursue a colonoscopy if any stool test returns positive.    4. FMLA Documentation:     - Assessment: Patient's FMLA documentation from the previous year remains adequate.     - Plan: Schedule an update for FMLA documentation around July.    5. Weight Loss:     - Assessment: Patient reported experiencing weight loss as a side effect of Topamax.     - Plan: Monitor his weight and overall health. Encourage participation in weight loss programs like [employer].    6. Blood Pressure Management:     - Assessment: Patient's blood pressure was noted to be good, with a reading of 70.     - Plan: Continue monitoring his blood pressure and adjust medications as needed.    7. Annual Lab Screening:     - Plan: Labs were ordered for July or August as part of an annual screening to monitor blood sugar, cholesterol, and other parameters. Instruct him to fast for 8 to 10 hours before the lab work, consuming water only.    8. Pre-diabetes:     - Assessment: Patient's last A1c was 6, placing him in the pre-diabetic range.     - Plan:          a. Continue monitoring A1c and fasting blood sugar levels during annual lab screenings.          b. Encourage him to maintain a healthy lifestyle and monitor blood sugar levels.    9. Follow-up Appointment:     - Plan: Schedule a six-month follow-up appointment in November. If any issues arise before then, he should contact the clinic for an earlier appointment.    Return in about 6 months (around 11/1/2024) for Chronic Health Conditions.      Please note that this dictation was created using voice recognition software. I have worked with consultants from the  vendor as well as technical experts from [facility] to optimize the interface. I have made every reasonable attempt to correct obvious errors, but I expect that there are errors of grammar and possibly content that I did not discover before finalizing the note.

## 2024-05-05 ENCOUNTER — PATIENT MESSAGE (OUTPATIENT)
Dept: MEDICAL GROUP | Facility: PHYSICIAN GROUP | Age: 49
End: 2024-05-05
Payer: COMMERCIAL

## 2024-05-06 RX ORDER — AMLODIPINE BESYLATE 10 MG/1
10 TABLET ORAL DAILY
Qty: 90 TABLET | Refills: 3 | Status: SHIPPED | OUTPATIENT
Start: 2024-05-06

## 2024-05-06 NOTE — PATIENT COMMUNICATION
Received request via: Patient    Was the patient seen in the last year in this department? Yes    Does the patient have an active prescription (recently filled or refills available) for medication(s) requested? No    Pharmacy Name: [facility]    Does the patient have custodial Plus and need 100 day supply (blood pressure, diabetes and cholesterol meds only)? Patient does not have SCP    Change of pharmacy

## 2024-05-24 RX ORDER — METFORMIN HYDROCHLORIDE 500 MG/1
500 TABLET, EXTENDED RELEASE ORAL DAILY
Qty: 90 TABLET | Refills: 3 | Status: SHIPPED | OUTPATIENT
Start: 2024-05-24

## 2024-07-16 ENCOUNTER — APPOINTMENT (OUTPATIENT)
Dept: NEUROLOGY | Facility: MEDICAL CENTER | Age: 49
End: 2024-07-16
Attending: STUDENT IN AN ORGANIZED HEALTH CARE EDUCATION/TRAINING PROGRAM
Payer: COMMERCIAL

## 2024-07-25 RX ORDER — CARBAMAZEPINE 200 MG/1
400 CAPSULE, EXTENDED RELEASE ORAL 2 TIMES DAILY
Qty: 360 CAPSULE | Refills: 3 | OUTPATIENT
Start: 2024-07-25

## 2024-08-05 NOTE — TELEPHONE ENCOUNTER
Received request via: Patient    Was the patient seen in the last year in this department? Yes    Does the patient have an active prescription (recently filled or refills available) for medication(s) requested? No    Pharmacy Name: [facility]    Does the patient have long-term Plus and need 100 day supply (blood pressure, diabetes and cholesterol meds only)? Patient does not have SCP

## 2024-08-12 RX ORDER — HYDROCHLOROTHIAZIDE 50 MG/1
50 TABLET ORAL DAILY
Qty: 90 TABLET | Refills: 0 | Status: SHIPPED | OUTPATIENT
Start: 2024-08-12

## 2024-09-22 ENCOUNTER — PATIENT MESSAGE (OUTPATIENT)
Dept: SPORTS MEDICINE | Facility: OTHER | Age: 49
End: 2024-09-22

## 2024-09-22 ENCOUNTER — OFFICE VISIT (OUTPATIENT)
Dept: URGENT CARE | Facility: CLINIC | Age: 49
End: 2024-09-22
Payer: COMMERCIAL

## 2024-09-22 VITALS
HEIGHT: 73 IN | BODY MASS INDEX: 41.75 KG/M2 | HEART RATE: 91 BPM | RESPIRATION RATE: 16 BRPM | DIASTOLIC BLOOD PRESSURE: 112 MMHG | TEMPERATURE: 97.9 F | OXYGEN SATURATION: 95 % | SYSTOLIC BLOOD PRESSURE: 164 MMHG | WEIGHT: 315 LBS

## 2024-09-22 DIAGNOSIS — R51.9 ACUTE NONINTRACTABLE HEADACHE, UNSPECIFIED HEADACHE TYPE: ICD-10-CM

## 2024-09-22 LAB
FLUAV RNA SPEC QL NAA+PROBE: NEGATIVE
FLUBV RNA SPEC QL NAA+PROBE: NEGATIVE
RSV RNA SPEC QL NAA+PROBE: NEGATIVE
SARS-COV-2 RNA RESP QL NAA+PROBE: NEGATIVE

## 2024-09-22 PROCEDURE — 0241U POCT CEPHEID COV-2, FLU A/B, RSV - PCR: CPT | Performed by: FAMILY MEDICINE

## 2024-09-22 PROCEDURE — 3077F SYST BP >= 140 MM HG: CPT | Performed by: FAMILY MEDICINE

## 2024-09-22 PROCEDURE — 99213 OFFICE O/P EST LOW 20 MIN: CPT | Performed by: FAMILY MEDICINE

## 2024-09-22 PROCEDURE — 3080F DIAST BP >= 90 MM HG: CPT | Performed by: FAMILY MEDICINE

## 2024-09-22 ASSESSMENT — FIBROSIS 4 INDEX: FIB4 SCORE: 1.26

## 2024-09-22 ASSESSMENT — ENCOUNTER SYMPTOMS
FEVER: 0
HEADACHES: 1

## 2024-09-22 NOTE — PROGRESS NOTES
Subjective:     Johan Wellington is a 49 y.o. male who presents for Head Ache (Headaches x 2 days/Roommate had tested positive for COVID )    HPI  Pt presents for evaluation of an acute problem  Pt with headache the past 2 days   No sore throat, nasal congestion, or cough   No vomiting or diarrhea   Roommate with recent COVID testing positive the past few days   Has had COVID before and was pretty rough     Review of Systems   Constitutional:  Negative for fever.   Neurological:  Positive for headaches.       PMH:  has no past medical history on file.  MEDS:   Current Outpatient Medications:     hydroCHLOROthiazide 50 MG Tab, TAKE 1 TABLET BY MOUTH ONCE DAILY, Disp: 90 Tablet, Rfl: 0    metFORMIN ER (GLUCOPHAGE XR) 500 MG TABLET SR 24 HR, TAKE 1 TABLET DAILY, Disp: 90 Tablet, Rfl: 3    amLODIPine (NORVASC) 10 MG Tab, Take 1 Tablet by mouth every day., Disp: 90 Tablet, Rfl: 3    topiramate (TOPAMAX) 50 MG tablet, Take 1 Tablet by mouth 2 times a day for 180 days., Disp: 180 Tablet, Rfl: 1    Rimegepant Sulfate (NURTEC) 75 MG TABLET DISPERSIBLE, Take 1 Tablet by mouth 1 time a day as needed (Take one at onset of Headache. May repeat in 24 hours if unrelieved.)., Disp: 8 Tablet, Rfl: 5    magnesium oxide 400 (240 Mg) MG Tab, Take 1 Tablet by mouth every day for 360 days., Disp: 90 Tablet, Rfl: 3    Riboflavin 400 MG Tab, Take 400 mg by mouth every day., Disp: 90 Tablet, Rfl: 3    cyclobenzaprine (FLEXERIL) 5 mg tablet, Take 1-2 Tablets by mouth 3 times a day as needed for Moderate Pain or Muscle Spasms., Disp: 90 Tablet, Rfl: 3    coenzyme Q-10 30 MG capsule, Take 2 Capsules by mouth every day., Disp: , Rfl:     B Complex Vitamins (B COMPLEX 1 PO), Take 1 Tablet by mouth every day., Disp: , Rfl:     Multiple Vitamins-Minerals (EMERGEN-C IMMUNE PLUS PO), Take 1 Tablet by mouth every day., Disp: , Rfl:     vitamin D3 (CHOLECALCIFEROL) 1000 Unit (25 mcg) Tab, Take 2 Tablets by mouth every day., Disp: , Rfl:  "  ALLERGIES: No Known Allergies  SURGHX: History reviewed. No pertinent surgical history.  SOCHX:  reports that he has never smoked. He has never used smokeless tobacco. He reports that he does not currently use alcohol. He reports that he does not currently use drugs.     Objective:   BP (!) 164/112 (BP Location: Left arm, Patient Position: Sitting, BP Cuff Size: Adult)   Pulse 91   Temp 36.6 °C (97.9 °F) (Temporal)   Resp 16   Ht 1.854 m (6' 1\")   Wt (!) 184 kg (406 lb)   SpO2 95%   BMI 53.57 kg/m²     Physical Exam  Constitutional:       General: He is not in acute distress.     Appearance: He is well-developed. He is not diaphoretic.   HENT:      Head: Normocephalic and atraumatic.      Right Ear: Tympanic membrane, ear canal and external ear normal.      Left Ear: Tympanic membrane, ear canal and external ear normal.      Nose: Nose normal.      Mouth/Throat:      Mouth: Mucous membranes are moist.      Pharynx: Oropharynx is clear. No oropharyngeal exudate or posterior oropharyngeal erythema.   Neck:      Trachea: No tracheal deviation.   Cardiovascular:      Rate and Rhythm: Normal rate and regular rhythm.      Heart sounds: Normal heart sounds.   Pulmonary:      Effort: Pulmonary effort is normal. No respiratory distress.      Breath sounds: Normal breath sounds. No wheezing or rales.   Musculoskeletal:         General: Normal range of motion.      Cervical back: Normal range of motion and neck supple. No tenderness.   Lymphadenopathy:      Cervical: No cervical adenopathy.   Skin:     General: Skin is warm and dry.      Findings: No rash.   Neurological:      Mental Status: He is alert.   Psychiatric:         Behavior: Behavior normal.         Thought Content: Thought content normal.         Judgment: Judgment normal.         Assessment/Plan:   Assessment    1. Acute nonintractable headache, unspecified headache type  - POCT CEPHEID COV-2, FLU A/B, RSV - PCR    Patient with a headache the past few " days.  Symptoms are mild, but he has close contact with COVID-19 and he is worried that this could be COVID.  Testing will be performed to rule out COVID-19.

## 2024-10-21 ENCOUNTER — TELEPHONE (OUTPATIENT)
Dept: NEUROLOGY | Facility: MEDICAL CENTER | Age: 49
End: 2024-10-21
Payer: COMMERCIAL

## 2024-11-04 ENCOUNTER — OFFICE VISIT (OUTPATIENT)
Dept: MEDICAL GROUP | Facility: PHYSICIAN GROUP | Age: 49
End: 2024-11-04
Payer: COMMERCIAL

## 2024-11-04 VITALS
RESPIRATION RATE: 18 BRPM | OXYGEN SATURATION: 98 % | WEIGHT: 315 LBS | TEMPERATURE: 98.1 F | BODY MASS INDEX: 41.75 KG/M2 | HEART RATE: 77 BPM | DIASTOLIC BLOOD PRESSURE: 70 MMHG | HEIGHT: 73 IN | SYSTOLIC BLOOD PRESSURE: 122 MMHG

## 2024-11-04 DIAGNOSIS — G44.029 CHRONIC CLUSTER HEADACHE, NOT INTRACTABLE: ICD-10-CM

## 2024-11-04 DIAGNOSIS — E78.5 DYSLIPIDEMIA: ICD-10-CM

## 2024-11-04 DIAGNOSIS — Z23 NEED FOR VACCINATION: ICD-10-CM

## 2024-11-04 DIAGNOSIS — G89.29 CHRONIC BILATERAL LOW BACK PAIN WITHOUT SCIATICA: ICD-10-CM

## 2024-11-04 DIAGNOSIS — Z12.11 ENCOUNTER FOR SCREENING FOR COLORECTAL MALIGNANT NEOPLASM: ICD-10-CM

## 2024-11-04 DIAGNOSIS — R56.9 SEIZURE (HCC): ICD-10-CM

## 2024-11-04 DIAGNOSIS — D18.02 HEMANGIOMA OF INTRACRANIAL STRUCTURE (HCC): ICD-10-CM

## 2024-11-04 DIAGNOSIS — E55.9 VITAMIN D DEFICIENCY: ICD-10-CM

## 2024-11-04 DIAGNOSIS — R73.03 PREDIABETES: ICD-10-CM

## 2024-11-04 DIAGNOSIS — I10 PRIMARY HYPERTENSION: ICD-10-CM

## 2024-11-04 DIAGNOSIS — G43.019 INTRACTABLE MIGRAINE WITHOUT AURA AND WITHOUT STATUS MIGRAINOSUS: ICD-10-CM

## 2024-11-04 DIAGNOSIS — Z13.29 SCREENING FOR THYROID DISORDER: ICD-10-CM

## 2024-11-04 DIAGNOSIS — Z12.12 ENCOUNTER FOR SCREENING FOR COLORECTAL MALIGNANT NEOPLASM: ICD-10-CM

## 2024-11-04 DIAGNOSIS — M54.50 CHRONIC BILATERAL LOW BACK PAIN WITHOUT SCIATICA: ICD-10-CM

## 2024-11-04 PROCEDURE — 90471 IMMUNIZATION ADMIN: CPT

## 2024-11-04 PROCEDURE — 99214 OFFICE O/P EST MOD 30 MIN: CPT | Mod: 25 | Performed by: NURSE PRACTITIONER

## 2024-11-04 PROCEDURE — 3078F DIAST BP <80 MM HG: CPT | Performed by: NURSE PRACTITIONER

## 2024-11-04 PROCEDURE — 90656 IIV3 VACC NO PRSV 0.5 ML IM: CPT

## 2024-11-04 PROCEDURE — 3074F SYST BP LT 130 MM HG: CPT | Performed by: NURSE PRACTITIONER

## 2024-11-04 RX ORDER — AMLODIPINE BESYLATE 10 MG/1
10 TABLET ORAL DAILY
Qty: 90 TABLET | Refills: 3 | Status: SHIPPED | OUTPATIENT
Start: 2024-11-04

## 2024-11-04 RX ORDER — METFORMIN HYDROCHLORIDE 500 MG/1
500 TABLET, EXTENDED RELEASE ORAL DAILY
Qty: 90 TABLET | Refills: 3 | Status: SHIPPED | OUTPATIENT
Start: 2024-11-04

## 2024-11-04 RX ORDER — TOPIRAMATE 50 MG/1
50 TABLET, FILM COATED ORAL 2 TIMES DAILY
Qty: 180 TABLET | Refills: 3 | Status: SHIPPED | OUTPATIENT
Start: 2024-11-04

## 2024-11-04 RX ORDER — METHOCARBAMOL 500 MG/1
500-1000 TABLET, FILM COATED ORAL 3 TIMES DAILY PRN
Qty: 120 TABLET | Refills: 3 | Status: SHIPPED | OUTPATIENT
Start: 2024-11-04

## 2024-11-04 RX ORDER — TOPIRAMATE 50 MG/1
50 TABLET, FILM COATED ORAL 2 TIMES DAILY
COMMUNITY
Start: 2024-08-22 | End: 2024-11-04 | Stop reason: SDUPTHER

## 2024-11-04 RX ORDER — HYDROCHLOROTHIAZIDE 50 MG/1
50 TABLET ORAL DAILY
Qty: 90 TABLET | Refills: 0 | Status: SHIPPED | OUTPATIENT
Start: 2024-11-04

## 2024-11-04 RX ORDER — CARBAMAZEPINE 200 MG/1
200 CAPSULE, EXTENDED RELEASE ORAL 2 TIMES DAILY
Qty: 180 CAPSULE | Refills: 3 | Status: SHIPPED | OUTPATIENT
Start: 2024-11-04

## 2024-11-04 RX ORDER — CARBAMAZEPINE 200 MG/1
200 CAPSULE, EXTENDED RELEASE ORAL 2 TIMES DAILY
COMMUNITY
End: 2024-11-04 | Stop reason: SDUPTHER

## 2024-11-04 ASSESSMENT — FIBROSIS 4 INDEX: FIB4 SCORE: 1.26

## 2024-11-04 NOTE — PROGRESS NOTES
"  Chief Complaint   Patient presents with    Chronic Care Management                                                                                                                                       HPI:   Johan presents today with the following.    Patient consented to the use of [employer] to record and transcribe notes during this visit.    The patient attended the consultation today to discuss medication changes, migraine management, and routine health maintenance.    The chief complaint presented by the patient is a request to switch from Flexeril to Methocarbamol (Robaxin) PRN for muscle relaxation, citing stronger effect and less sedation. She also notes a change in prescription delivery from [facility] to various pharmacies.    Regarding migraine management, she is currently taking carbamazepine and topiramate. She reports a significant reduction in frequency and intensity of migraines. She is also using Nurtec, which she obtains for free via a coupon. She describes her migraines as now manageable compared to previously being debilitating. She mentions encouraging a coworker with suspected migraines to seek medical help.    She is due for several routine health maintenance procedures, including:  - Laboratory tests  - Colonoscopy (last performed approximately 1 year ago)  - Flu shot (scheduled for this visit)    The patient's current health management strategies and concerns have been discussed, with particular attention to medication changes, migraine management, and upcoming routine health procedures.    ROS:  All systems negative expect as addressed in assessment and plan.     /70 (BP Location: Left arm, Patient Position: Sitting)   Pulse 77   Temp 36.7 °C (98.1 °F) (Temporal)   Resp 18   Ht 1.854 m (6' 1\")   Wt (!) 234 kg (515 lb)   SpO2 98%   BMI 67.95 kg/m²     Objective:    Physical Exam  Vitals reviewed.   Constitutional:       Appearance: Normal appearance. He is obese.   HENT:    "   Head: Normocephalic and atraumatic.      Mouth/Throat:      Mouth: Mucous membranes are moist.   Eyes:      Extraocular Movements: Extraocular movements intact.      Conjunctiva/sclera: Conjunctivae normal.   Pulmonary:      Effort: Pulmonary effort is normal.   Musculoskeletal:         General: Normal range of motion.      Cervical back: Normal range of motion.   Skin:     General: Skin is warm and dry.   Neurological:      General: No focal deficit present.      Mental Status: He is alert and oriented to person, place, and time.   Psychiatric:         Mood and Affect: Mood normal.         Behavior: Behavior normal.         Thought Content: Thought content normal.         Diagnostic Test Results:  - Laboratory Tests:    - Last labs: Approximately 1 year ago.    - New orders: Placed for routine screening including cholesterol and A1c levels.      Assessment and Plan:  49 y.o. male with the following issues.    1. Intractable migraine without aura and without status migrainosus  - carbamazepine (CARBATROL) 200 MG CR capsule; Take 1 Capsule by mouth 2 times a day.  Dispense: 180 Capsule; Refill: 3  - methocarbamol (ROBAXIN) 500 MG Tab; Take 1-2 Tablets by mouth 3 times a day as needed (muscle spasms).  Dispense: 120 Tablet; Refill: 3  - topiramate (TOPAMAX) 50 MG tablet; Take 1 Tablet by mouth 2 times a day.  Dispense: 180 Tablet; Refill: 3  - Rimegepant Sulfate (NURTEC) 75 MG TABLET DISPERSIBLE; Take 1 Tablet by mouth 1 time a day as needed (Take one at onset of Headache. May repeat in 24 hours if unrelieved.).  Dispense: 8 Tablet; Refill: 5    2. Need for vaccination  - INFLUENZA VACCINE TRI INJ (PF)    3. Encounter for screening for colorectal malignant neoplasm  - Cologuard® colon cancer screening    4. Chronic cluster headache, not intractable  - carbamazepine (CARBATROL) 200 MG CR capsule; Take 1 Capsule by mouth 2 times a day.  Dispense: 180 Capsule; Refill: 3  - topiramate (TOPAMAX) 50 MG tablet; Take 1  Tablet by mouth 2 times a day.  Dispense: 180 Tablet; Refill: 3    5. Primary hypertension  - amLODIPine (NORVASC) 10 MG Tab; Take 1 Tablet by mouth every day.  Dispense: 90 Tablet; Refill: 3  - hydroCHLOROthiazide 50 MG Tab; Take 1 Tablet by mouth every day.  Dispense: 90 Tablet; Refill: 0    6. Vitamin D deficiency  - VITAMIN D,25 HYDROXY (DEFICIENCY); Future    7. Prediabetes  - metFORMIN ER (GLUCOPHAGE XR) 500 MG TABLET SR 24 HR; Take 1 Tablet by mouth every day.  Dispense: 90 Tablet; Refill: 3  - Comp Metabolic Panel; Future  - HEMOGLOBIN A1C; Future    8. Chronic bilateral low back pain without sciatica    9. Hemangioma of intracranial structure (HCC)    10. Screening for thyroid disorder  - TSH; Future  - FREE THYROXINE; Future    11. Dyslipidemia  - Lipid Profile; Future    12. Seizure (HCC)    1. Migraine management:     - Assessment: Reduced frequency and intensity with carbamazepine and topiramate.     - Plan:          a. Continue current regimen.          b. Refill and send to [facility].          c. Follow up if changes in pattern or severity.    2. Medication changes:     - Plan:          a. Switch from Flexeril to Methocarbamol for muscle spasms (less sedating).          b. Send Methocarbamol prescription to [facility] pharmacy.    3. Prescription management:     - Plan:          a. Consolidate her regular prescriptions to [facility].          b. Send her PRN medications (muscle relaxer, Nurtec) to [facility] pharmacy.    4. Preventive care:     - Plan:          a. Order routine screening labs (cholesterol panel, A1c).          b. Resubmit Cologuard kit order for home delivery.          c. Administer flu shot today.    5. Blood type inquiry:     - Plan:          a. Advise blood donation to determine her blood type cost-effectively.    6. FMLA paperwork:     - Plan:          a. Bring forms to May appointment for completion (for her July FMLA).    7. Follow-up:     - Plan:          a. Schedule  6-month follow-up.          b. Contact clinic if issues arise before her appointment.    Return in about 6 months (around 5/4/2025) for Lab Review and migraines.      Please note that this dictation was created using voice recognition software. I have worked with consultants from the vendor as well as technical experts from [facility] to optimize the interface. I have made every reasonable attempt to correct obvious errors, but I expect that there are errors of grammar and possibly content that I did not discover before finalizing the note.

## 2025-01-02 ENCOUNTER — APPOINTMENT (OUTPATIENT)
Dept: NEUROLOGY | Facility: MEDICAL CENTER | Age: 50
End: 2025-01-02
Attending: STUDENT IN AN ORGANIZED HEALTH CARE EDUCATION/TRAINING PROGRAM
Payer: COMMERCIAL

## 2025-02-06 ENCOUNTER — OFFICE VISIT (OUTPATIENT)
Dept: NEUROLOGY | Facility: MEDICAL CENTER | Age: 50
End: 2025-02-06
Attending: STUDENT IN AN ORGANIZED HEALTH CARE EDUCATION/TRAINING PROGRAM
Payer: COMMERCIAL

## 2025-02-06 ENCOUNTER — PATIENT MESSAGE (OUTPATIENT)
Dept: NEUROLOGY | Facility: MEDICAL CENTER | Age: 50
End: 2025-02-06

## 2025-02-06 VITALS
RESPIRATION RATE: 16 BRPM | HEIGHT: 75 IN | BODY MASS INDEX: 39.17 KG/M2 | HEART RATE: 81 BPM | SYSTOLIC BLOOD PRESSURE: 142 MMHG | DIASTOLIC BLOOD PRESSURE: 96 MMHG | TEMPERATURE: 98.3 F | WEIGHT: 315 LBS | OXYGEN SATURATION: 98 %

## 2025-02-06 DIAGNOSIS — Z91.89 AT RISK FOR OBSTRUCTIVE SLEEP APNEA: ICD-10-CM

## 2025-02-06 DIAGNOSIS — G40.109 LOCALIZATION-RELATED EPILEPSY (HCC): ICD-10-CM

## 2025-02-06 DIAGNOSIS — G43.909 ACUTE MIGRAINE: ICD-10-CM

## 2025-02-06 DIAGNOSIS — G43.019 INTRACTABLE MIGRAINE WITHOUT AURA AND WITHOUT STATUS MIGRAINOSUS: ICD-10-CM

## 2025-02-06 DIAGNOSIS — G44.029 CHRONIC CLUSTER HEADACHE, NOT INTRACTABLE: ICD-10-CM

## 2025-02-06 PROCEDURE — 99214 OFFICE O/P EST MOD 30 MIN: CPT | Performed by: STUDENT IN AN ORGANIZED HEALTH CARE EDUCATION/TRAINING PROGRAM

## 2025-02-06 PROCEDURE — 3080F DIAST BP >= 90 MM HG: CPT | Performed by: STUDENT IN AN ORGANIZED HEALTH CARE EDUCATION/TRAINING PROGRAM

## 2025-02-06 PROCEDURE — 99212 OFFICE O/P EST SF 10 MIN: CPT | Performed by: STUDENT IN AN ORGANIZED HEALTH CARE EDUCATION/TRAINING PROGRAM

## 2025-02-06 PROCEDURE — 3077F SYST BP >= 140 MM HG: CPT | Performed by: STUDENT IN AN ORGANIZED HEALTH CARE EDUCATION/TRAINING PROGRAM

## 2025-02-06 RX ORDER — CARBAMAZEPINE 200 MG/1
200 CAPSULE, EXTENDED RELEASE ORAL 2 TIMES DAILY
Qty: 180 CAPSULE | Refills: 1 | Status: SHIPPED | OUTPATIENT
Start: 2025-02-06 | End: 2025-02-27 | Stop reason: SDUPTHER

## 2025-02-06 RX ORDER — TOPIRAMATE 50 MG/1
50 TABLET, FILM COATED ORAL 2 TIMES DAILY
Qty: 180 TABLET | Refills: 1 | Status: SHIPPED | OUTPATIENT
Start: 2025-02-06 | End: 2025-08-05

## 2025-02-06 ASSESSMENT — PATIENT HEALTH QUESTIONNAIRE - PHQ9
5. POOR APPETITE OR OVEREATING: 1 - SEVERAL DAYS
CLINICAL INTERPRETATION OF PHQ2 SCORE: 2
SUM OF ALL RESPONSES TO PHQ QUESTIONS 1-9: 8

## 2025-02-06 ASSESSMENT — FIBROSIS 4 INDEX: FIB4 SCORE: 1.26

## 2025-02-06 NOTE — PROGRESS NOTES
[facility]  Follow up visit    Patient name: Johan Wellington  YOB: 1975  MRN: [id number]  Date of visit:  2/6/2025      Background:    Johan Wellington is a 49 y.o. 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 Last visit 4/26/2024.    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 cavernous hemangioma  Frequency: Once     Previous treatments: Dilantin - gingival hyperplasia, transitioned to CBZ in 20040  Status Epilepticus: no    He has baseline blurred vision since having his hemorrhage in 2022. It improves typically later in the day.       Risk factors for epileptic seizures:  (+) L frontoparietal hemangioma with resultant hemorrhage  Normal birth history, no complications, born at term.   No history of significant head trauma.   No history of stroke or meningitis.  No family history of epilepsy.   No febrile seizures.       Interval history:  Patient unaccompanied to visit.     No interval seizures.     Had an appointment with sleep medicine for [place] but had to cancel due to a work issue.     Last seizure: August 2002     Current treatment: Carbamazepine 400 mg BID (200 mg 2 capsules twice daily) Topamax 50 mg BID for migraine    Migraines: He feels that being on the Topamax has been helpful as a migraine preventive. He also takes riboflavin 400 mg daily and magnesium oxide 400 mg daily. He has Nurtec as an abortive. His migraines occur every couple of months. Nurtec will decrease a headache of 10/10 severity to 3-4/10.    Blurred vision: baseline since ICH, possibly worse since starting TPM    Side effects: possibly blurred vision     Mood: sees a counselor, established care in 9/2024 2/6/2025     3:20 PM 4/26/2024     4:20 PM 6/21/2023    11:00 AM 1/14/2022     3:00 PM 10/1/2021     5:00 PM   PHQ-9  Screening   Little interest or pleasure in doing things 1 - several days 0 - not at all 1 - several days 0 - not at all 0 - not at all   Feeling down, depressed, or hopeless 1 - several days 0 - not at all 1 - several days 0 - not at all 0 - not at all   Trouble falling or staying asleep, or sleeping too much 1 - several days  1 - several days     Feeling tired or having little energy 2 - more than half the days  1 - several days     Poor appetite or overeating 1 - several days  1 - several days     Feeling bad about yourself - or that you are a failure or have let yourself or your family down 1 - several days  1 - several days     Trouble concentrating on things, such as reading the newspaper or watching television 1 - several days  1 - several days     Moving or speaking so slowly that other people could have noticed. Or the opposite - being so fidgety or restless that you have been moving around a lot more than usual 0 - not at all  0 - not at all     Thoughts that you would be better off dead, or of hurting yourself in some way 0 - not at all  0 - not at all     PHQ-2 Total Score 2 0 2 0 0   PHQ-9 Total Score 8  7         Interpretation of PHQ-9 Total Score   Score Severity   1-4 No Depression   5-9 Mild Depression   10-14 Moderate Depression   15-19 Moderately Severe Depression   20-27 Severe Depression    [place] Suicide Severity Rating Scale     Wish to be Dead?: No  Suicidal Thoughts: No    Suicidal Thoughts with Method Without Specific Plan or Intent to Act:    Suicidal Intent Without Specific Plan:    Suicide Intent with Specific Plan:    Suicide Behavior Question: No  How long ago did you do any of these?:    C-SSRS Risk Level: No Risk       Side effects: possibly blurred vision     Driving: yes     Vitamin D: taking       Main issue has been migraines. Started Topamax at last visit. No migraines for 2 months, longest interval that he can remember being migraine-free. He had one migraine recently and  took a Nurtec which helped. He has usually woken up with a headache however with the last couple of headaches they happened towards the end of the day.     Initially was very tired with Topamax but he has since adjusted.     He has been trying to think about what to do about increasing the Topamax dose and the implications of anti-seizure protection with carbatrol.    Current Medications:   Current Outpatient Medications:     carbamazepine (CARBATROL) 200 MG CR capsule, Take 1 Capsule by mouth 2 times a day., Disp: 180 Capsule, Rfl: 3    methocarbamol (ROBAXIN) 500 MG Tab, Take 1-2 Tablets by mouth 3 times a day as needed (muscle spasms)., Disp: 120 Tablet, Rfl: 3    amLODIPine (NORVASC) 10 MG Tab, Take 1 Tablet by mouth every day., Disp: 90 Tablet, Rfl: 3    hydroCHLOROthiazide 50 MG Tab, Take 1 Tablet by mouth every day., Disp: 90 Tablet, Rfl: 0    metFORMIN ER (GLUCOPHAGE XR) 500 MG TABLET SR 24 HR, Take 1 Tablet by mouth every day., Disp: 90 Tablet, Rfl: 3    topiramate (TOPAMAX) 50 MG tablet, Take 1 Tablet by mouth 2 times a day., Disp: 180 Tablet, Rfl: 3    Rimegepant Sulfate (NURTEC) 75 MG TABLET DISPERSIBLE, Take 1 Tablet by mouth 1 time a day as needed (Take one at onset of Headache. May repeat in 24 hours if unrelieved.)., Disp: 8 Tablet, Rfl: 5    magnesium oxide 400 (240 Mg) MG Tab, Take 1 Tablet by mouth every day for 360 days., Disp: 90 Tablet, Rfl: 3    Riboflavin 400 MG Tab, Take 400 mg by mouth every day., Disp: 90 Tablet, Rfl: 3    coenzyme Q-10 30 MG capsule, Take 2 Capsules by mouth every day., Disp: , Rfl:     B Complex Vitamins (B COMPLEX 1 PO), Take 1 Tablet by mouth every day., Disp: , Rfl:     Multiple Vitamins-Minerals (EMERGEN-C IMMUNE PLUS PO), Take 1 Tablet by mouth every day., Disp: , Rfl:     vitamin D3 (CHOLECALCIFEROL) 1000 Unit (25 mcg) Tab, Take 2 Tablets by mouth every day., Disp: , Rfl:     Allergies: No Known Allergies      Physical Exam:   Ambulatory Vitals  Vitals:    02/06/25  1453   BP: (!) 142/96   Pulse: 81   Resp: 16   Temp: 36.8 °C (98.3 °F)   SpO2: 98%         Constitutional: Well-developed, well-nourished, good hygiene. Appears stated age.  Respiratory: normal respiratory effort  Skin: Warm, dry, intact. No rashes observed.  Neurologic:   Mental Status: Awake, alert, oriented x 4.   Speech: Fluent with normal prosody.   Memory: Able to recall recent and remote events accurately.    Concentration: Attentive. Able to focus on history and follow multi-step commands.   Fund of Knowledge: Appropriate.   Cranial Nerves:    CN II: PERRL    CN III, IV, VI: EOMI without nystagmus    CN V: Facial sensation intact and symmetric in all 3 trigeminal distributions    CN VII: No facial asymmetry    CN VIII: Hearing intact to voice    CN IX and X: Palate elevates symmetrically, gag reflex not tested    CN XI: Symmetric shoulder shrug     CN XII: Tongue midline   Motor: moving all 4 extremities fully and equally   Sensory: Intact and equal to light touch diffusely    Gait: ambulates steadily without assistive device. Romberg negative. Able to perform tandem gait.    Movements: No resting tremors or abnormal movements observed.     Studies:      Labs reviewed      Imaging:   CTA head wwo 1/3/2024  IMPRESSION:     Grossly stable sized left frontotemporal enhancing lesion with increased intralesional calcification which could be consistent with the patient's known hemangioma. CT angiogram of the [place] of [place] otherwise within normal limits.     EEG Results:      Routine EEG 1/3/2024  INTERPRETATION:   Normal video EEG recording in the awake and drowsy state(s):  - No regional slowing or persistent focal asymmetries were seen.  - No epileptiform discharges were seen.  - No seizures. Clinical correlation is recommended.        Note: A normal EEG does not rule out the possibility of seizures or exclude a diagnosis of epilepsy.  If the clinical suspicion remains high for seizures, a prolonged recording  to capture clinical or subclinical events may be helpful.      Assessment/Plan:   Johan Wellington is a 49 y.o. man with a history of localization related seizure x 1 who is here for follow up.      Localization-related epilepsy  CTA 1/2024 with left frontotemporal enhancing lesion with increased intralesional calcification, most likely related to the known hemangioma which serves as a nidus for seizures. Reassuringly, he has been seizure-free on medication. He had a recent routine EEG which was normal, however it did not capture sleep which may decrease the yield of detecting abnormalities. With the abnormal imaging/hemangioma he is at risk for seizures if he were to stop medication.     Doing well on current regimen. No dosing changes made today. Addendum: dose confirmed with patient via [employer], he takes Carbatrol 200 mg two tablets twice daily for a total dose of 400 mg BID.      He takes vitamin D supplementation.      No contraindication to driving given seizure freedom.         Episodic migraine  Started Topamax in 2/2023. Discussed at length about proper dosing of Carbatrol and Topamax to ensure seizure protection. Essentially, he must stay on Carbatrol unless he is able to tolerate a therapeutic dose of Topamax for anti-seizure purposes. He is aware that at a dose of 50 mg BID Topamax will not prevent seizures and he needs to continue carbamazepine with no dosing changes for now. He will consider whether he wants to increase Topamax dose further and send a message via [employer].      At risk for sleep apnea  -Referral to sleep medicine - scheduled in August 2024    Elevated blood pressure reading  BP in office today 142/96  Recommend trending BP and discussing medication options with PCP      Follow up in approximately 6 months.       Danita Teresa M.D.   Diplomate, Neurology with Special Qualification in Epilepsy, American Board of Psychiatry and Neurology   of Clinical Neurology,  [employer]  Level III Epilepsy Center, Department of Neurology at [facility]       During today's encounter we discussed available treatment options and their individual side effect profiles. Total encounter time caring for patient today 37 minutes.

## 2025-02-07 ENCOUNTER — TELEPHONE (OUTPATIENT)
Dept: NEUROLOGY | Facility: MEDICAL CENTER | Age: 50
End: 2025-02-07

## 2025-02-10 NOTE — PATIENT COMMUNICATION
Called patient let him know Dr Teresa sent him a wripl message. She wants him to get labs she ordered at recent visit done. He was not sure of the dose of  carBAMazepine ER  is taking. He will look when he gets off work and send wripl message.

## 2025-02-14 NOTE — TELEPHONE ENCOUNTER
I already took care of this they wanted days supply. I was able to call them and give the days supply.

## 2025-02-27 DIAGNOSIS — G43.019 INTRACTABLE MIGRAINE WITHOUT AURA AND WITHOUT STATUS MIGRAINOSUS: ICD-10-CM

## 2025-02-27 DIAGNOSIS — G44.029 CHRONIC CLUSTER HEADACHE, NOT INTRACTABLE: ICD-10-CM

## 2025-02-27 RX ORDER — CARBAMAZEPINE 200 MG/1
400 CAPSULE, EXTENDED RELEASE ORAL 2 TIMES DAILY
Qty: 360 CAPSULE | Refills: 1 | Status: SHIPPED | OUTPATIENT
Start: 2025-02-27 | End: 2025-08-26

## 2025-03-08 DIAGNOSIS — G43.019 INTRACTABLE MIGRAINE WITHOUT AURA AND WITHOUT STATUS MIGRAINOSUS: ICD-10-CM

## 2025-03-11 RX ORDER — MAGNESIUM OXIDE 400 MG/1
400 TABLET ORAL DAILY
Qty: 90 TABLET | Refills: 0 | Status: SHIPPED | OUTPATIENT
Start: 2025-03-11

## 2025-03-11 NOTE — TELEPHONE ENCOUNTER
Received request via: Pharmacy    Medication Name/Dosage MAG OX 400mg    When was medication last prescribed 2/13/24    How many refills were previously provided 3    How many Refills does he patient have left from last prescription 0    Was the patient seen in the last year in this department? Yes   Date of last office visit 2/6/25     Per last Neurology Office Visit, when was the date of next follow up visit set for?                            Date of office visit follow up request 8/6/25     Does the patient have an upcoming appointment? Yes   If yes, when 8/8/25             If no, schedule appointment -    Does the patient have [facility] Plus and need 100 day supply (blood pressure, diabetes and cholesterol meds only)? Patient does not have SCP

## 2025-05-05 DIAGNOSIS — I10 PRIMARY HYPERTENSION: ICD-10-CM

## 2025-05-06 ENCOUNTER — OFFICE VISIT (OUTPATIENT)
Dept: MEDICAL GROUP | Facility: PHYSICIAN GROUP | Age: 50
End: 2025-05-06
Payer: COMMERCIAL

## 2025-05-06 VITALS
SYSTOLIC BLOOD PRESSURE: 130 MMHG | RESPIRATION RATE: 18 BRPM | DIASTOLIC BLOOD PRESSURE: 80 MMHG | WEIGHT: 315 LBS | BODY MASS INDEX: 39.17 KG/M2 | OXYGEN SATURATION: 98 % | HEART RATE: 92 BPM | TEMPERATURE: 98.7 F | HEIGHT: 75 IN

## 2025-05-06 DIAGNOSIS — I10 PRIMARY HYPERTENSION: ICD-10-CM

## 2025-05-06 DIAGNOSIS — Z11.4 ENCOUNTER FOR SCREENING FOR HIV: ICD-10-CM

## 2025-05-06 DIAGNOSIS — G43.019 INTRACTABLE MIGRAINE WITHOUT AURA AND WITHOUT STATUS MIGRAINOSUS: ICD-10-CM

## 2025-05-06 DIAGNOSIS — Z11.59 ENCOUNTER FOR HEPATITIS C VIRUS SCREENING TEST FOR HIGH RISK PATIENT: ICD-10-CM

## 2025-05-06 DIAGNOSIS — G44.029 CHRONIC CLUSTER HEADACHE, NOT INTRACTABLE: ICD-10-CM

## 2025-05-06 DIAGNOSIS — Z91.89 ENCOUNTER FOR HEPATITIS C VIRUS SCREENING TEST FOR HIGH RISK PATIENT: ICD-10-CM

## 2025-05-06 PROCEDURE — 3079F DIAST BP 80-89 MM HG: CPT | Performed by: NURSE PRACTITIONER

## 2025-05-06 PROCEDURE — 99214 OFFICE O/P EST MOD 30 MIN: CPT | Performed by: NURSE PRACTITIONER

## 2025-05-06 PROCEDURE — 3075F SYST BP GE 130 - 139MM HG: CPT | Performed by: NURSE PRACTITIONER

## 2025-05-06 RX ORDER — HYDROCHLOROTHIAZIDE 50 MG/1
50 TABLET ORAL DAILY
Qty: 90 TABLET | Refills: 3 | Status: SHIPPED | OUTPATIENT
Start: 2025-05-06

## 2025-05-06 ASSESSMENT — FIBROSIS 4 INDEX: FIB4 SCORE: 1.26

## 2025-05-12 RX ORDER — AMLODIPINE BESYLATE 10 MG/1
10 TABLET ORAL DAILY
Qty: 90 TABLET | Refills: 0 | Status: SHIPPED | OUTPATIENT
Start: 2025-05-12

## 2025-07-23 ENCOUNTER — PATIENT MESSAGE (OUTPATIENT)
Dept: MEDICAL GROUP | Facility: PHYSICIAN GROUP | Age: 50
End: 2025-07-23
Payer: COMMERCIAL

## 2025-07-23 DIAGNOSIS — G43.019 INTRACTABLE MIGRAINE WITHOUT AURA AND WITHOUT STATUS MIGRAINOSUS: ICD-10-CM

## 2025-07-23 DIAGNOSIS — G44.029 CHRONIC CLUSTER HEADACHE, NOT INTRACTABLE: ICD-10-CM

## 2025-07-24 RX ORDER — CARBAMAZEPINE 200 MG/1
400 CAPSULE, EXTENDED RELEASE ORAL 2 TIMES DAILY
Qty: 360 CAPSULE | Refills: 1 | Status: SHIPPED | OUTPATIENT
Start: 2025-07-24 | End: 2025-07-30 | Stop reason: SDUPTHER

## 2025-07-24 NOTE — PATIENT COMMUNICATION
Received request via: Patient    Was the patient seen in the last year in this department? Yes    Does the patient have an active prescription (recently filled or refills available) for medication(s) requested? No    Pharmacy Name:      [facility] - [address]  [address] [address]  Phone: [phone] Fax: [phone]       Does the patient have [facility] Plus and need 100-day supply? (This applies to ALL medications) Patient does not have SCP

## 2025-07-27 DIAGNOSIS — R73.03 PREDIABETES: ICD-10-CM

## 2025-07-28 ENCOUNTER — TELEPHONE (OUTPATIENT)
Dept: MEDICAL GROUP | Facility: PHYSICIAN GROUP | Age: 50
End: 2025-07-28
Payer: COMMERCIAL

## 2025-07-28 RX ORDER — METFORMIN HYDROCHLORIDE 500 MG/1
500 TABLET, EXTENDED RELEASE ORAL DAILY
Qty: 90 TABLET | Refills: 0 | Status: SHIPPED | OUTPATIENT
Start: 2025-07-28

## 2025-07-28 NOTE — TELEPHONE ENCOUNTER
1. Name: Johan Wellington    Call Back Number: [phone]          How would the patient prefer to be contacted with a response: Phone call OK to leave a detailed message    2. Which medication(s) is being requested?     Carbamazepine    3. What is the preferred Pharmacy?     [facility]    Patient was informed they may receive a return phone call from our office with any additional questions before processing this request.

## 2025-07-30 RX ORDER — CARBAMAZEPINE 200 MG/1
400 CAPSULE, EXTENDED RELEASE ORAL 2 TIMES DAILY
Qty: 360 CAPSULE | Refills: 1 | Status: SHIPPED | OUTPATIENT
Start: 2025-07-30 | End: 2026-01-26

## 2025-07-30 NOTE — PATIENT COMMUNICATION
Patient called me stated that the Rx was sent to the wrong pharmacy. He has been having issues with [facility] and they are not filling his medication correctly. Patient would like to change his Rx to [facility]. Patient is out. He was due for a refill on 7/24/25.  [facility], he just spoke with them?    I have pend the Rx.    Please advise?

## 2025-08-08 ENCOUNTER — OFFICE VISIT (OUTPATIENT)
Dept: NEUROLOGY | Facility: MEDICAL CENTER | Age: 50
End: 2025-08-08
Attending: STUDENT IN AN ORGANIZED HEALTH CARE EDUCATION/TRAINING PROGRAM
Payer: COMMERCIAL

## 2025-08-08 VITALS
DIASTOLIC BLOOD PRESSURE: 82 MMHG | SYSTOLIC BLOOD PRESSURE: 132 MMHG | WEIGHT: 315 LBS | TEMPERATURE: 98.7 F | BODY MASS INDEX: 39.17 KG/M2 | OXYGEN SATURATION: 97 % | HEART RATE: 91 BPM | RESPIRATION RATE: 20 BRPM | HEIGHT: 75 IN

## 2025-08-08 DIAGNOSIS — G43.019 INTRACTABLE MIGRAINE WITHOUT AURA AND WITHOUT STATUS MIGRAINOSUS: ICD-10-CM

## 2025-08-08 DIAGNOSIS — Z91.89 AT RISK FOR OBSTRUCTIVE SLEEP APNEA: ICD-10-CM

## 2025-08-08 DIAGNOSIS — G40.109 LOCALIZATION-RELATED EPILEPSY (HCC): Primary | ICD-10-CM

## 2025-08-08 DIAGNOSIS — G40.909 SEIZURE DISORDER (HCC): ICD-10-CM

## 2025-08-08 PROCEDURE — 3075F SYST BP GE 130 - 139MM HG: CPT | Performed by: STUDENT IN AN ORGANIZED HEALTH CARE EDUCATION/TRAINING PROGRAM

## 2025-08-08 PROCEDURE — 99214 OFFICE O/P EST MOD 30 MIN: CPT | Performed by: STUDENT IN AN ORGANIZED HEALTH CARE EDUCATION/TRAINING PROGRAM

## 2025-08-08 PROCEDURE — 3079F DIAST BP 80-89 MM HG: CPT | Performed by: STUDENT IN AN ORGANIZED HEALTH CARE EDUCATION/TRAINING PROGRAM

## 2025-08-08 RX ORDER — TOPIRAMATE 50 MG/1
50 TABLET, FILM COATED ORAL 2 TIMES DAILY
Qty: 180 TABLET | Refills: 1 | Status: CANCELLED | OUTPATIENT
Start: 2025-08-08 | End: 2026-02-04

## 2025-08-08 RX ORDER — TOPIRAMATE 50 MG/1
50 TABLET, FILM COATED ORAL 2 TIMES DAILY
Qty: 180 TABLET | Refills: 1 | Status: SHIPPED | OUTPATIENT
Start: 2025-08-08 | End: 2026-02-04

## 2025-08-08 RX ORDER — TOPIRAMATE 50 MG/1
50 TABLET, FILM COATED ORAL DAILY
COMMUNITY
Start: 2025-07-28 | End: 2025-08-08 | Stop reason: SDUPTHER

## 2025-08-08 RX ORDER — CARBAMAZEPINE 200 MG/1
400 CAPSULE, EXTENDED RELEASE ORAL 2 TIMES DAILY
Qty: 360 CAPSULE | Refills: 1 | Status: SHIPPED | OUTPATIENT
Start: 2025-08-08 | End: 2026-02-04

## 2025-08-08 ASSESSMENT — PATIENT HEALTH QUESTIONNAIRE - PHQ9
SUM OF ALL RESPONSES TO PHQ QUESTIONS 1-9: 8
CLINICAL INTERPRETATION OF PHQ2 SCORE: 2
5. POOR APPETITE OR OVEREATING: 1 - SEVERAL DAYS